# Patient Record
Sex: MALE | Race: BLACK OR AFRICAN AMERICAN | NOT HISPANIC OR LATINO | Employment: UNEMPLOYED | URBAN - METROPOLITAN AREA
[De-identification: names, ages, dates, MRNs, and addresses within clinical notes are randomized per-mention and may not be internally consistent; named-entity substitution may affect disease eponyms.]

---

## 2017-02-05 ENCOUNTER — HOSPITAL ENCOUNTER (EMERGENCY)
Facility: MEDICAL CENTER | Age: 52
End: 2017-02-16
Attending: EMERGENCY MEDICINE
Payer: MEDICAID

## 2017-02-05 DIAGNOSIS — F15.10 METHAMPHETAMINE ABUSE (HCC): ICD-10-CM

## 2017-02-05 LAB
ALBUMIN SERPL BCP-MCNC: 4.5 G/DL (ref 3.2–4.9)
ALBUMIN/GLOB SERPL: 1.7 G/DL
ALP SERPL-CCNC: 88 U/L (ref 30–99)
ALT SERPL-CCNC: 26 U/L (ref 2–50)
AMPHET UR QL SCN: POSITIVE
ANION GAP SERPL CALC-SCNC: 9 MMOL/L (ref 0–11.9)
AST SERPL-CCNC: 36 U/L (ref 12–45)
BARBITURATES UR QL SCN: NEGATIVE
BASOPHILS # BLD AUTO: 0.7 % (ref 0–1.8)
BASOPHILS # BLD: 0.07 K/UL (ref 0–0.12)
BENZODIAZ UR QL SCN: NEGATIVE
BILIRUB SERPL-MCNC: 1 MG/DL (ref 0.1–1.5)
BUN SERPL-MCNC: 12 MG/DL (ref 8–22)
BZE UR QL SCN: NEGATIVE
CALCIUM SERPL-MCNC: 9.8 MG/DL (ref 8.5–10.5)
CANNABINOIDS UR QL SCN: NEGATIVE
CHLORIDE SERPL-SCNC: 100 MMOL/L (ref 96–112)
CO2 SERPL-SCNC: 25 MMOL/L (ref 20–33)
CREAT SERPL-MCNC: 0.87 MG/DL (ref 0.5–1.4)
EKG IMPRESSION: NORMAL
EOSINOPHIL # BLD AUTO: 0.34 K/UL (ref 0–0.51)
EOSINOPHIL NFR BLD: 3.3 % (ref 0–6.9)
ERYTHROCYTE [DISTWIDTH] IN BLOOD BY AUTOMATED COUNT: 42.3 FL (ref 35.9–50)
ETHANOL BLD-MCNC: 0 G/DL
GFR SERPL CREATININE-BSD FRML MDRD: >60 ML/MIN/1.73 M 2
GLOBULIN SER CALC-MCNC: 2.7 G/DL (ref 1.9–3.5)
GLUCOSE SERPL-MCNC: 145 MG/DL (ref 65–99)
HCT VFR BLD AUTO: 42.8 % (ref 42–52)
HGB BLD-MCNC: 14.4 G/DL (ref 14–18)
IMM GRANULOCYTES # BLD AUTO: 0.02 K/UL (ref 0–0.11)
IMM GRANULOCYTES NFR BLD AUTO: 0.2 % (ref 0–0.9)
LYMPHOCYTES # BLD AUTO: 2.12 K/UL (ref 1–4.8)
LYMPHOCYTES NFR BLD: 20.6 % (ref 22–41)
MCH RBC QN AUTO: 30.6 PG (ref 27–33)
MCHC RBC AUTO-ENTMCNC: 33.6 G/DL (ref 33.7–35.3)
MCV RBC AUTO: 90.9 FL (ref 81.4–97.8)
MDMA UR QL SCN: NEGATIVE
METHADONE UR QL SCN: NEGATIVE
MONOCYTES # BLD AUTO: 1.02 K/UL (ref 0–0.85)
MONOCYTES NFR BLD AUTO: 9.9 % (ref 0–13.4)
NEUTROPHILS # BLD AUTO: 6.7 K/UL (ref 1.82–7.42)
NEUTROPHILS NFR BLD: 65.3 % (ref 44–72)
NRBC # BLD AUTO: 0 K/UL
NRBC BLD AUTO-RTO: 0 /100 WBC
OPIATES UR QL SCN: NEGATIVE
OXYCODONE UR QL SCN: NEGATIVE
PCP UR QL SCN: NEGATIVE
PLATELET # BLD AUTO: 304 K/UL (ref 164–446)
PMV BLD AUTO: 9.6 FL (ref 9–12.9)
POTASSIUM SERPL-SCNC: 3.9 MMOL/L (ref 3.6–5.5)
PROPOXYPH UR QL SCN: NEGATIVE
PROT SERPL-MCNC: 7.2 G/DL (ref 6–8.2)
RBC # BLD AUTO: 4.71 M/UL (ref 4.7–6.1)
SODIUM SERPL-SCNC: 134 MMOL/L (ref 135–145)
WBC # BLD AUTO: 10.3 K/UL (ref 4.8–10.8)

## 2017-02-05 PROCEDURE — 80307 DRUG TEST PRSMV CHEM ANLYZR: CPT

## 2017-02-05 PROCEDURE — 700102 HCHG RX REV CODE 250 W/ 637 OVERRIDE(OP): Performed by: EMERGENCY MEDICINE

## 2017-02-05 PROCEDURE — A9270 NON-COVERED ITEM OR SERVICE: HCPCS | Performed by: EMERGENCY MEDICINE

## 2017-02-05 PROCEDURE — 36415 COLL VENOUS BLD VENIPUNCTURE: CPT

## 2017-02-05 PROCEDURE — 80053 COMPREHEN METABOLIC PANEL: CPT

## 2017-02-05 PROCEDURE — 93005 ELECTROCARDIOGRAM TRACING: CPT | Performed by: EMERGENCY MEDICINE

## 2017-02-05 PROCEDURE — 99285 EMERGENCY DEPT VISIT HI MDM: CPT

## 2017-02-05 PROCEDURE — 700105 HCHG RX REV CODE 258: Performed by: EMERGENCY MEDICINE

## 2017-02-05 PROCEDURE — 85025 COMPLETE CBC W/AUTO DIFF WBC: CPT

## 2017-02-05 RX ORDER — LORAZEPAM 1 MG/1
1 TABLET ORAL ONCE
Status: COMPLETED | OUTPATIENT
Start: 2017-02-05 | End: 2017-02-05

## 2017-02-05 RX ORDER — SODIUM CHLORIDE 9 MG/ML
1000 INJECTION, SOLUTION INTRAVENOUS ONCE
Status: COMPLETED | OUTPATIENT
Start: 2017-02-05 | End: 2017-02-05

## 2017-02-05 RX ORDER — ZIPRASIDONE HYDROCHLORIDE 20 MG/1
20 CAPSULE ORAL ONCE
Status: COMPLETED | OUTPATIENT
Start: 2017-02-05 | End: 2017-02-05

## 2017-02-05 RX ADMIN — LORAZEPAM 1 MG: 1 TABLET ORAL at 13:50

## 2017-02-05 RX ADMIN — ZIPRASIDONE HCL 20 MG: 20 CAPSULE ORAL at 14:16

## 2017-02-05 RX ADMIN — SODIUM CHLORIDE 1000 ML: 9 INJECTION, SOLUTION INTRAVENOUS at 13:51

## 2017-02-05 NOTE — ED AVS SNAPSHOT
Home Care Instructions                                                                                                                Ulises Matthews   MRN: [id number]    Department:  [facility], Emergency Dept   Date of Visit:  2/5/2017            [facility], Emergency Dept    [address] [facility] [address]    Phone:  [phone]      You were seen by     1. Jason Cochran M.D.    2. Johny John M.D.    3. Stuart Zafar M.D.    4. Chuck Montero M.D.    5. Walod Hamilton M.D.    6. Zion Mcgill M.D.    7. Doc Valencia M.D.    8. Noe Tubbs M.D.    9. Aric Marie M.D.    10. JACOBY Belcher.O.    11. Jayson Dillon M.D.    12. Yaya Manley M.D.    13. Mayur Chun M.D.    14. Epi Palafox M.D.    15. Gadiel Perrin M.D.    16. Wicho Alvarado M.D.    17. JACOBY Jarquin.O.    18. JACOBY Moya.O.    19. Yvette Gudino M.D.    20. JACOBY Hennessy.O.    21. Sulaiman Osorio M.D.      Your Diagnosis Was     Methamphetamine abuse     F15.10       These are the medications you received during your hospitalization from 02/05/2017 1255 to 02/16/2017 1745     Date/Time Order Dose Route Action    02/05/2017 1351 NS infusion 1,000 mL 1,000 mL Intravenous New Bag    02/05/2017 1416 ziprasidone (GEODON) capsule 20 mg 20 mg Oral Given    02/05/2017 1350 lorazepam (ATIVAN) tablet 1 mg 1 mg Oral Given    02/06/2017 0045 haloperidol lactate (HALDOL) injection 5 mg 5 mg Intramuscular Given    02/06/2017 0045 lorazepam (ATIVAN) injection 2 mg 2 mg Intramuscular Given    02/08/2017 0831 risperidone (RISPERDAL) tablet 1 mg 1 mg Oral Given    02/07/2017 2052 risperidone (RISPERDAL) tablet 1 mg 1 mg Oral Given    02/07/2017 0840 risperidone (RISPERDAL) tablet 1 mg 1 mg Oral Given    02/06/2017 2050 risperidone (RISPERDAL) tablet 1 mg 1 mg Oral Given    02/06/2017 1015 risperidone (RISPERDAL) tablet 1 mg 1 mg Oral Given    02/16/2017 0854 risperidone (RISPERDAL) tablet 2 mg 2 mg Oral Given    02/15/2017 2108 risperidone (RISPERDAL) tablet 2 mg 2 mg Oral Given    02/15/2017 0920 risperidone (RISPERDAL) tablet 2 mg 2 mg Oral Given    02/14/2017 2053 risperidone (RISPERDAL) tablet 2 mg 2 mg Oral Given    02/14/2017 0921 risperidone (RISPERDAL) tablet 2 mg 2 mg Oral Given    02/13/2017 2206 risperidone (RISPERDAL) tablet 2 mg 2 mg Oral Given    02/13/2017 0900 risperidone (RISPERDAL) tablet 2 mg 2 mg Oral Given    02/12/2017 2151 risperidone (RISPERDAL) tablet 2 mg 2 mg Oral Given    02/12/2017 0800 risperidone (RISPERDAL) tablet 2 mg 2 mg Oral Given    02/11/2017 2119 risperidone (RISPERDAL) tablet 2 mg 2 mg Oral Given    02/11/2017 0801 risperidone (RISPERDAL) tablet 2 mg 2 mg Oral Given    02/10/2017 2051 risperidone (RISPERDAL) tablet 2 mg 2 mg Oral Given    02/10/2017 1137 risperidone (RISPERDAL) tablet 2 mg 2 mg Oral Given    02/10/2017 0849 risperidone (RISPERDAL) tablet 2 mg 2 mg Oral Refused    02/09/2017 2108 risperidone (RISPERDAL) tablet 2 mg 2 mg Oral Given    02/09/2017 0853 risperidone (RISPERDAL) tablet 2 mg 2 mg Oral Given    02/08/2017 1956 risperidone (RISPERDAL) tablet 2 mg 2 mg Oral Given    02/10/2017 1135 acetaminophen (TYLENOL) tablet 650 mg 650 mg Oral Given    02/12/2017 1427 ibuprofen (MOTRIN) tablet 600 mg 600 mg Oral Given      Medication Information     Review all of your home medications and newly ordered medications with your primary doctor and/or pharmacist as soon as possible. Follow medication instructions as directed by your doctor and/or pharmacist.     Please keep your complete medication list with you and share with your physician. Update the information when medications are discontinued, doses are changed, or new medications (including over-the-counter products) are added; and carry medication information at all times in the event of emergency situations.               Medication List      Notice        You have not been prescribed any medications.            Procedures and tests performed during your visit     Procedure/Test Number of Times Performed    Blood Alcohol 1    CBC WITH DIFFERENTIAL 1    COMP METABOLIC PANEL 1    Cardiac Monitoring 1    EKG (NOW) 1    ESTIMATED GFR 1    IP CONSULT TO  2    IV Saline Lock 1    Life-Skills consult 1    NOTIFY ADMITTING  OF SUICIDE RISK 1    NURSING COMMUNICATION 2    No Visitors 2    Obtain Old EKG 1    Offer patient opportunity to perform ADLs daily 2    Offer same diet options as would be offered to the inpatient population 2    PHYSICAN COMMUNICATION 1    RN to Round on Patient every 2 hours 2    Transfer patient to [facility] under direct observation 2    URINE DRUG SCREEN (TRIAGE) 1    Vital Signs every 4 hours 2        Discharge Instructions       Refrain from further methamphetamine abuse          Patient Information     Patient Information    Following emergency treatment: all patient requiring follow-up care must return either to a private physician or a clinic if your condition worsens before you are able to obtain further medical attention, please return to the emergency room.     Billing Information    At [facility], we work to make the billing process streamlined for our patients.  Our Representatives are here to answer any questions you may have regarding your hospital bill.  If you have insurance coverage and have supplied your insurance information to us, we will submit a claim to your insurer on your behalf.  Should you have any questions regarding your bill, we can be reached online or by phone as follows:  Online: You are able pay your bills online or live chat with our representatives about any billing questions you may have. We are here to help Monday - Friday from 8:00am to 7:30pm and 9:00am - 12:00pm on Saturdays.  Please visit [email or web address]  for more information.   Phone:  [phone]  or [phone]    Please note that your emergency physician, surgeon, pathologist, radiologist, anesthesiologist, and other specialists are not employed by [facility] and will therefore bill separately for their services.  Please contact them directly for any questions concerning their bills at the numbers below:     Emergency Physician Services:  [phone]  [facility]:  [phone]  [facility]:  [phone]  [facility]:  [phone]    1. Your final bill may vary from the amount quoted upon discharge if all procedures are not complete at that time, or if your doctor has additional procedures of which we are not aware. You will receive an additional bill if you return to the Emergency Department at [facility] for suture removal regardless of the facility of which the sutures were placed.     2. Please arrange for settlement of this account at the emergency registration.    3. All self-pay accounts are due in full at the time of treatment.  If you are unable to meet this obligation then payment is expected within 4-5 days.     4. If you have had radiology studies (CT, X-ray, Ultrasound, MRI), you have received a preliminary result during your emergency department visit. Please contact the radiology department [phone] to receive a copy of your final result. Please discuss the Final result with your primary physician or with the follow up physician provided.     Crisis Hotline:  [facility] [place] Crisis Hotline:  9-410-HNCZGOZ or 1-312.706.3800  [place] Crisis Hotline:    1-642.370.1995 or 706-720-8163         ED Discharge Follow Up Questions    1. In order to provide you with very good care, we would like to follow up with a phone call in the next few days.  May we have your permission to contact you?     YES /  NO    2. What is the best phone number to call you? (       )_____-__________    3. What is the best time to call you?      Morning  /  Afternoon  /   Evening                   Patient Signature:  ____________________________________________________________    Date:  ____________________________________________________________

## 2017-02-05 NOTE — ED AVS SNAPSHOT
2/16/2017          Ulises NIETO [facility]  [address] [place] [address]    Dear Ulises:    [facility] wants to ensure your discharge home is safe and you or your loved ones have had all your questions answered regarding your care after you leave the hospital.    You may receive a telephone call within two days of your discharge.  This call is to make certain you understand your discharge instructions as well as ensure we provided you with the best care possible during your stay with us.     The call will only last approximately 3-5 minutes and will be done by a nurse.    Once again, we want to ensure your discharge home is safe and that you have a clear understanding of any next steps in your care.  If you have any questions or concerns, please do not hesitate to contact us, we are here for you.  Thank you for choosing [facility] for your healthcare needs.    Sincerely,    Jayson Armendariz    [facility]

## 2017-02-05 NOTE — ED AVS SNAPSHOT
SenseHere Technology Access Code: [email or web address]  Expires: 3/7/2017 11:55 PM    Your email address is not on file at [facility].  Email Addresses are required for you to sign up for [employer], please contact [phone] to verify your personal information and to provide your email address prior to attempting to register for [employer].    Ulises NIETO [facility]  [address] [place] [address]    [employer]  A secure, online tool to manage your health information     [facility]’s SenseHere Technology® is a secure, online tool that connects you to your personalized health information from the privacy of your home -- day or night - making it very easy for you to manage your healthcare. Once the activation process is completed, you can even access your medical information using the SenseHere Technology amparo, which is available for free in the Apple Amparo store or Google Play store.     To learn more about SenseHere Technology, visit [email or web address]    There are two levels of access available (as shown below):   My Chart Features  [facility] Primary Care Doctor [facility]  Specialists [facility]  Urgent  Care Non-[facility] Primary Care Doctor   Email your healthcare team securely and privately 24/7 X X X    Manage appointments: schedule your next appointment; view details of past/upcoming appointments X      Request prescription refills. X      View recent personal medical records, including lab and immunizations X X X X   View health record, including health history, allergies, medications X X X X   Read reports about your outpatient visits, procedures, consult and ER notes X X X X   See your discharge summary, which is a recap of your hospital and/or ER visit that includes your diagnosis, lab results, and care plan X X  X     How to register for [employer]:  Once your e-mail address has been verified, follow the following steps to sign up for [employer].     1. Go to  [email or web address]  2. Click on the Sign Up Now box, which takes you to the New Member Sign Up page. You will need  to provide the following information:  a. Enter your Bo[employer] Access Code exactly as it appears at the top of this page. (You will not need to use this code after you’ve completed the sign-up process. If you do not sign up before the expiration date, you must request a new code.)   b. Enter your date of birth.   c. Enter your home email address.   d. Click Submit, and follow the next screen’s instructions.  3. Create a Boqii ID. This will be your Boqii login ID and cannot be changed, so think of one that is secure and easy to remember.  4. Create a Boqii password. You can change your password at any time.  5. Enter your Password Reset Question and Answer. This can be used at a later time if you forget your password.   6. Enter your e-mail address. This allows you to receive e-mail notifications when new information is available in Bo[employer].  7. Click Sign Up. You can now view your health information.    For assistance activating your [employer] account, call [phone]

## 2017-02-05 NOTE — ED NOTES
Chief Complaint   Patient presents with   • Hallucinations     Pt BIB EMS from [facility]. pt reports taking meth yesterday. pt denies thought of SI/HI, pt rambling in triage making paranoid statements.    • Anxiety

## 2017-02-06 PROCEDURE — 700111 HCHG RX REV CODE 636 W/ 250 OVERRIDE (IP)

## 2017-02-06 PROCEDURE — A9270 NON-COVERED ITEM OR SERVICE: HCPCS | Performed by: PSYCHIATRY & NEUROLOGY

## 2017-02-06 PROCEDURE — 700102 HCHG RX REV CODE 250 W/ 637 OVERRIDE(OP): Performed by: PSYCHIATRY & NEUROLOGY

## 2017-02-06 PROCEDURE — 90791 PSYCH DIAGNOSTIC EVALUATION: CPT

## 2017-02-06 RX ORDER — HALOPERIDOL 5 MG/ML
5 INJECTION INTRAMUSCULAR ONCE
Status: COMPLETED | OUTPATIENT
Start: 2017-02-06 | End: 2017-02-06

## 2017-02-06 RX ORDER — LORAZEPAM 2 MG/ML
INJECTION INTRAMUSCULAR
Status: COMPLETED
Start: 2017-02-06 | End: 2017-02-06

## 2017-02-06 RX ORDER — RISPERIDONE 1 MG/1
1 TABLET ORAL 2 TIMES DAILY
Status: DISCONTINUED | OUTPATIENT
Start: 2017-02-06 | End: 2017-02-08

## 2017-02-06 RX ORDER — LORAZEPAM 2 MG/ML
2 INJECTION INTRAMUSCULAR ONCE
Status: COMPLETED | OUTPATIENT
Start: 2017-02-06 | End: 2017-02-06

## 2017-02-06 RX ORDER — HALOPERIDOL 5 MG/ML
INJECTION INTRAMUSCULAR
Status: COMPLETED
Start: 2017-02-06 | End: 2017-02-06

## 2017-02-06 RX ADMIN — RISPERIDONE 1 MG: 1 TABLET, FILM COATED ORAL at 10:15

## 2017-02-06 RX ADMIN — LORAZEPAM 2 MG: 2 INJECTION INTRAMUSCULAR at 00:45

## 2017-02-06 RX ADMIN — HALOPERIDOL 5 MG: 5 INJECTION INTRAMUSCULAR at 00:45

## 2017-02-06 RX ADMIN — HALOPERIDOL LACTATE 5 MG: 5 INJECTION, SOLUTION INTRAMUSCULAR at 00:45

## 2017-02-06 RX ADMIN — LORAZEPAM 2 MG: 2 INJECTION INTRAMUSCULAR; INTRAVENOUS at 00:45

## 2017-02-06 RX ADMIN — RISPERIDONE 1 MG: 1 TABLET, FILM COATED ORAL at 20:50

## 2017-02-06 ASSESSMENT — PAIN SCALES - GENERAL: PAINLEVEL_OUTOF10: 0

## 2017-02-06 NOTE — ED PROVIDER NOTES
"ED Provider Note    CHIEF COMPLAINT  Chief Complaint   Patient presents with   • Hallucinations     Pt BIB EMS from [facility]. pt reports taking meth yesterday. pt denies thought of SI/HI, pt rambling in triage making paranoid statements.    • Anxiety       HPI  Ulises Matthesw is a 51 y.o. male who presents to the emergency department brought in by ambulance from a local hotel after he was observed making paranoid statements and exhibiting abnormal behavior. Reportedly the patient has been using methamphetamine. The patient is very talkative at the time of arrival but he is quite rambling and really does not make any sense and clinically appears to be intoxicated. The patient cannot provide a reliable review of systems or history of present illness at this time    REVIEW OF SYSTEMS the patient cannot provide a reliable review of systems at this time    PAST MEDICAL HISTORY  Past Medical History   Diagnosis Date   • Psychiatric Disorder      depression       FAMILY HISTORY  No family history on file.    SOCIAL HISTORY  Social History     Social History   • Marital Status: Single     Spouse Name: N/A   • Number of Children: N/A   • Years of Education: N/A     Social History Main Topics   • Smoking status: Current Every Day Smoker   • Smokeless tobacco: Not on file   • Alcohol Use: Yes   • Drug Use: Yes      Comment: cocaine   • Sexual Activity: Not on file     Other Topics Concern   • Not on file     Social History Narrative   • No narrative on file       SURGICAL HISTORY  Past Surgical History   Procedure Laterality Date   • Other abdominal surgery       hernia repair       CURRENT MEDICATIONS  Home Medications     **Home medications have not yet been reviewed for this encounter**          ALLERGIES  No Known Allergies    PHYSICAL EXAM  VITAL SIGNS: /82 mmHg  Pulse 90  Temp(Src) 36.7 °C (98 °F) (Temporal)  Resp 18  Ht 1.829 m (6' 0.01\")  Wt 79.5 kg (175 lb 4.3 oz)  BMI 23.77 kg/m2  SpO2 99% "   Oxygen saturation is interpreted as adequate  Constitutional: The patient is awake he is verbal he's rambling  HENT: No sign of acute trauma to the head  Eyes: Pupils round extraocular motion present  Neck: Trachea midline no JVD  Cardiovascular: Regular rate and rhythm  Lungs: Clear and equal bilaterally with no apparent difficulty breathing  Abdomen/Back: Soft nondistended nontender no peritoneal findings  Skin: Warm and dry  Musculoskeletal: No acute bony deformity  Neurologic: Awake and verbal and moving all extremities    Laboratory  A CBC shows a normal white blood count of 10.3, hemoglobin is adequate at 14.4, basic metabolic panel shows a minimally elevated blood glucose of 145, LFTs are unremarkable, blood alcohol level is 0, urine toxicology screen is positive for amphetamine    EKG interpretation  A 12-lead EKG shows a sinus rhythm 85 bpm large voltages were noted in V1 and V2 and V3 with T-wave inversion in V1 and V2 and J-point elevation in V3 and findings suggest left ventricular hypertrophy.    MEDICAL DECISION MAKING and DISPOSITION  In the emergency department an IV was established the patient was given intravenous fluids as well as oral Geodon and Ativan. He is resting comfortably and sleeping he is arousable. His mental status does seem to be improving although he can still not carry on a normal conversation. At this point in time we are going to give the patient some more time to metabolize the methamphetamine and once he is able to carry on a regular conversation he will require reevaluation. The patient's care will be signed out to the overnight ER doctor    IMPRESSION  1. Methamphetamine abuse         Electronically signed by: Jason Cochran, 2/5/2017 6:18 PM

## 2017-02-06 NOTE — ED NOTES
"Med rec updated and complete  Allergies reviewed  Pt states \"No prescription medications for over a year\".  Pt states \"No vitamins or OTC's\".  Pt states \"No antibiotics in the last 30 days\".    "

## 2017-02-06 NOTE — ED NOTES
"Upon discharge . Pt started to say \"im mental, im going to kill myself. im going to throw myself off of the [place] . Every one is trying to kill me. im going to die,they are going to kill me. im mental.\" pt then begun to become aggressive yelling, screaming stating  \" you mother fuckers dont do shit. All you care about is i did meth . im fucking retarded assholes.\" security at bedside. MD notified. life skills notified.  "

## 2017-02-06 NOTE — ED NOTES
Pt given meal and crackers in addition to medication. Pt much more calm from recent incident, compliant and resting in bed.

## 2017-02-06 NOTE — DISCHARGE PLANNING
Medical Social Work    Referral: Legal Hold    Intervention: Legal Hold Paperwork given to SW by Life Skills RN: Angie    Legal Hold Initiated: Date: 02/06/2017 Time: 0030    Patient’s Insurance Listed on Face Sheet: Medicaid HMO    [facility]    Plan: Patient will transfer to mental health facility once acceptance is obtained.

## 2017-02-06 NOTE — PROGRESS NOTES
Patient's home medications have been reviewed by the pharmacy team.     Past Medical History   Diagnosis Date   • Psychiatric Disorder      depression       Patient's Medications   New Prescriptions    No medications on file   Previous Medications    No medications on file   Modified Medications    No medications on file   Discontinued Medications    QUETIAPINE FUMARATE (SEROQUEL PO)    Take  by mouth every day.          A:  Patient denies taking medications for over one year.    P:    No specific recommendations at this time. Manage symptoms PRN.  Defer to Psychiatry's recommendations    Libra Barroso, PharmD, BCPS

## 2017-02-06 NOTE — ED PROVIDER NOTES
ED Provider Note    The patient continues to await transfer for inpatient psychiatric evaluation and treatment. His vital signs remained normal. He has been fed and ambulatory. He is currently resting with no acute needs.

## 2017-02-06 NOTE — CONSULTS
[facility]   TRIAGE ASSESSMENT    Name: Ulises Matthews  MRN: [id number]  : 1965  Age: 51 y.o.  Date of assessment: 2017  PCP: Pcp Pt States None  Persons in attendance: Patient    CHIEF COMPLAINT/PRESENTING ISSUE as stated by Dr John, patient has been in the ER for over 12 hours without making any SI/HI statements. As soon as he was being discharged.  He threatened to kill himself and anyone else. Positive for meth on arrival.  He refused this assessment.    Chief Complaint   Patient presents with   • Hallucinations     Pt BIB EMS from [facility]. pt reports taking meth yesterday. pt denies thought of SI/HI, pt rambling in triage making paranoid statements.    • Anxiety        CURRENT LIVING SITUATION/SOCIAL SUPPORT:Homeless    BEHAVIORAL HEALTH TREATMENT HISTORY  Does patient/parent report a history of prior behavioral health treatment for patient?   Yes:    Dates Level of Care Facilty/Provider Diagnosis/Problem Medications    from the ER [facility] per our records                                                                          SAFETY ASSESSMENT - SELF  Does patient acknowledge current or past symptoms of dangerousness to self? Yes, made states when being discharged.  Does parent/significant other report patient has current or past symptoms of dangerousness to self? N\A  Does presenting problem suggest symptoms of dangerousness to self? Yes:  Put on legal hold by Dr John   Past Current    Suicidal Thoughts: []  []    Suicidal Plans: []  []    Suicidal Intent: []  []    Suicide Attempts: []  []    Self-Injury []  []      For any boxes checked above, provide detail: made statements in the past and today.  Still alive.    History of suicide by family member: no  History of suicide by friend/significant other: no  Recent change in frequency/specificity/intensity of suicidal thoughts or self-harm behavior? no  Current access to firearms, medications, or other  "identified means of suicide/self-harm? no  If yes, willing to restrict access to means of suicide/self-harm? no  Protective factors present:  on legal hold    SAFETY ASSESSMENT - OTHERS  Does patient acknowledge current or past symptoms of aggressive behavior or risk to others? Yes, made threats today.  Does parent/significant other report patient has current or past symptoms of aggressive behavior or risk to others?  N\A  Does presenting problem suggest symptoms of dangerousness to others? No    Crisis Safety Plan completed and copy given to patient? N\A    ABUSE/NEGLECT SCREENING  Does patient report feeling “unsafe” in his/her home, or afraid of anyone?  no  Does patient report any history of physical, sexual, or emotional abuse?  no  Does parent or significant other report any of the above? N\A  Is there evidence of neglect by self?  no  Is there evidence of neglect by a caregiver? no  Does the patient/parent report any history of CPS/APS/police involvement related to suspected abuse/neglect or domestic violence? no  Based on the information provided during the current assessment, is a mandated report of suspected abuse/neglect being made?  No    SUBSTANCE USE SCREENING  Yes:  Jacky all substances used in the past 30 days:chronic long term meth use disorder      Last Use Amount   []   Alcohol     []   Marijuana     []   Heroin     []   Prescription Opioids  (used without prescription, for    recreation, or in excess of prescribed amount)     []   Other Prescription  (used without prescription, for    recreation, or in excess of prescribed amount)     []   Cocaine      [x]   Methamphetamine yesterday    []   \"\" drugs (ectasy, MDMA)     []   Other substances        UDS results: positive for methamphetamines    Breathalyzer results: 0.0    What consequences does the patient associate with any of the above substance use and or addictive behaviors? Other: impacts every area of his life.    Risk factors for " detox (check all that apply):None   []  Seizures   []  Diaphoretic (sweating)   []  Tremors   []  Hallucinations   []  Increased blood pressure   []  Decreased blood pressure   []  Other   [x]  None      [] Patient education on risk factors for detoxification and instructed to return to ER as needed.    MENTAL STATUS   Participation: would not participate  Grooming: Disheveled  Orientation: would not participate  Behavior: asleep  Eye contact: none  Mood: Angry and Irritable  earlier  Affect: none  Thought process: Goal-directed  Thought content: Paranoia  On meth  Speech: Loud  Earlier  Perception: would not participate  Memory:  No gross evidence of memory deficits  Insight: would not participate  Judgment:  would not particpate  Other:    Collateral information:   Source:  [] Significant other present in person:   [] Significant other by telephone  [] [facility]   [] [facility] Nursing Staff  [x] [facility], no assessments found.  [] Other:     [] Unable to complete full assessment due to:  [] Acute intoxication  [x] Patient declined to participate/engage  [] Patient verbally unresponsive  [] Significant cognitive deficits  [] Significant perceptual distortions or behavioral disorganization  [] Other:      CLINICAL IMPRESSIONS:  Primary:  Reports SI/HI so he did not have to leave the ER, malinger suspected  Secondary:  Homeless      IDENTIFIED NEEDS/PLAN:  [Trigger DISPOSITION list for any items marked]  Made statements tonight  [x]  Imminent safety risk - self [x] Imminent safety risk - others   []  Acute substance withdrawl []  Psychosis/Impaired reality testing   [x]  Mood/anxiety []  Substance use/Addictive behavior   []  Maladaptive behaviro []  Parent/child conflict   []  Family/Couples conflict []  Biomedical   [x]  Housing [x]  Financial   []   Legal  Occupational/Educational   []  Domestic violence []  Other:     Disposition: Refer to [facility] and [facility]    Does patient express  agreement with the above plan? no    Referral appointment(s) scheduled? N\A    Alert team only:   I have discussed findings and recommendations with Dr. John who is in agreement with these recommendations.   Legal hold completed.  Referral documentation sent to the following facilities:  [facility] and [facility] for evaluation and treatment.     Angie Hills R.N.  2/6/2017

## 2017-02-06 NOTE — ED PROVIDER NOTES
ED Provider Note    The patient is alert and appropriate at this time. Therefore he'll be discharged with instructions to refrain from any further substance abuse.    At the time of discharge the patient became acutely suicidal and agitated. He states is given a jump off the [place]  to kill himself. Therefore the patient replaced on a legal hold and life skills will evaluate the patient. He is medically cleared for psychiatric care at this time.

## 2017-02-06 NOTE — ED NOTES
Patient denies pain and is resting comfortably. Updated on breakfast tray ordered and to be given when box lunch delivered

## 2017-02-06 NOTE — ED NOTES
Patient denies pain and is resting comfortably.    no renal colic/no flank pain L/normal urinary frequency/no flank pain R/no bladder infections/no urinary hesitancy/no urine discoloration/no incontinence/no nocturia/no dysuria/no hematuria

## 2017-02-07 PROCEDURE — 700102 HCHG RX REV CODE 250 W/ 637 OVERRIDE(OP): Performed by: PSYCHIATRY & NEUROLOGY

## 2017-02-07 PROCEDURE — A9270 NON-COVERED ITEM OR SERVICE: HCPCS | Performed by: PSYCHIATRY & NEUROLOGY

## 2017-02-07 RX ADMIN — RISPERIDONE 1 MG: 1 TABLET, FILM COATED ORAL at 20:52

## 2017-02-07 RX ADMIN — RISPERIDONE 1 MG: 1 TABLET, FILM COATED ORAL at 08:40

## 2017-02-07 NOTE — ED NOTES
Patient is resting comfortably. Rise and fall of chest noted.  Med given per mar.  No other request at this time

## 2017-02-07 NOTE — PSYCHIATRY
"PSYCHIATRIC CONSULTATION:  Reason for consult: psychosis   Requesting Physician: Sammie  Psychiatric Supervising Attending: Juan F        Chief Complaint:   \"I'm not okay right now\"    HPI:  Was agitated and mildly hostile during the interview. He was taking meth and was thinking of jumping off the [place] of the [place]. He was behaving strangely and states he can't really remember what happened. He states he started hearing voices and then took some meth which made it worse. He was disorganized. He gets distracted very easily. He reports that still wants to die. He is a very poor historian and can't give me much information about his psychiatric care outside of here. He can't remember medications he has tried in the past. He thinks risperdal has worked for him.     Review of Current Systems:  Depression +depressed mood  +anhedonia  Low energy   Sonam no signs/s symptoms   Anxiety +  Psychosis +  PTSD unable to assess  OCD  Unable to asses   Medical: no active issues     MSE:  Vitals:   Filed Vitals:    02/06/17 2009 02/07/17 0044 02/07/17 0400 02/07/17 0907   BP: 106/69 103/70 106/79 108/63   Pulse: 82 79 80 80   Temp: 36.7 °C (98 °F) 37.1 °C (98.8 °F) 36.9 °C (98.4 °F) 36.8 °C (98.3 °F)   TempSrc:       Resp: 17 14 16 16   Height:       Weight:       SpO2:  98% 97% 96%       Musculoskeletal: no complaints   Appearance: +psychomotor retardation   Thought Process: +thought blocking   Though Content: +ah +delusions, paranoia   Speech: slow   Mood:depressed  Affect: blunted   SI/HI:+si   Attention: poor   Memory:fair   Orientation: oriented   Insight and Judgment:  Not oriented to date   Neurological Testing: n/a    Past Psych Hx:  Previous outpatient psych treatment     Family Psych Hx:  Unable to full assess    Social Hx:  Social History     Social History   • Marital Status: Single     Spouse Name: N/A   • Number of Children: N/A   • Years of Education: N/A     Occupational History   • Not on file.     Social " History Main Topics   • Smoking status: Current Every Day Smoker   • Smokeless tobacco: Not on file   • Alcohol Use: Yes   • Drug Use: Yes      Comment: cocaine   • Sexual Activity: Not on file     Other Topics Concern   • Not on file     Social History Narrative   • No narrative on file       Drugs/Alcohol/Tobacco Hx:   +meth     Medical Hx:    Past Medical History   Diagnosis Date   • Psychiatric Disorder      depression              Allergies:  No Known Allergies    Medications:   No current facility-administered medications on file prior to encounter.     No current outpatient prescriptions on file prior to encounter.       Labs/ Imaging:  No orders to display         SI/HI Assessment Risk:   High      Assessment:  MDD   Amphetamine Use disorder    Substance induced psychosis      Plan:  Starting risperdal 1mg po bid         Psych will follow.   Thank you for the consult.

## 2017-02-07 NOTE — PSYCHIATRY
PSYCHIATRIC FOLLOW-UP NOTE  Supervising Attending: Juan F    ID:   50 y/o man with amphetamine use disorder, psychosis     S:  Reports he is feeling slightly better today, voices decreasing, feels suicidal however. Quite depressed. Getting very distracted and has a hard time finishing conversation with me.     ROS:  +depression   +pschosis   No signs/symptoms nadya   All other systems reviewed and are negative.     MSE:  Vitals:  Filed Vitals:    02/06/17 2009 02/07/17 0044 02/07/17 0400 02/07/17 0907   BP: 106/69 103/70 106/79 108/63   Pulse: 82 79 80 80   Temp: 36.7 °C (98 °F) 37.1 °C (98.8 °F) 36.9 °C (98.4 °F) 36.8 °C (98.3 °F)   TempSrc:       Resp: 17 14 16 16   Height:       Weight:       SpO2:  98% 97% 96%       Appearance: grooming wnl   Behavior+psychomotor retardation   Speech:slow, blunted tone   Mood:depressed  Affect: constricted   Thought Process:thought blocking   Thought Content  delusions  Cognition: impaired  Memory:impaired  Attention:poor   Judgment:poor   Insight poor     A:  Pyschosis nos, schizoaffective vs mdd with psychosis   Amphetamine use disorder     P:  Continue risperdal

## 2017-02-07 NOTE — ED PROVIDER NOTES
ED PROVIDER NOTE    Scribed for Zion Mcgill M.D. by Meena Isabel. 2/7/2017, 6:00 AM.    This is an addendum to the note on Ulises Matthews. For further details and full chart entry, see the previously signed ED Provider Note written by Dr. Zafar (ERP).      6:00 AM- I discussed the patient's case with Dr. Zafar (ERP) who will transfer care of the patient to me at this time.        1:30 PM - The patient has done well during my period of observation. They are resting comfortably. They continue to await transfer to an inpatient psychiatric facility.      I, Meena Isabel (Scribe), am scribing for, and in the presence of, Zion Mcgill M.D..    Electronically signed by: Meena Isabel (Jacintoibe), 2/7/2017    IZion M.D. personally performed the services described in this documentation, as scribed by Meena Isabel in my presence, and it is both accurate and complete.    The note accurately reflects work and decisions made by me.  Zion Mcgill  2/7/2017  2:01 PM

## 2017-02-08 PROCEDURE — 700102 HCHG RX REV CODE 250 W/ 637 OVERRIDE(OP): Performed by: PSYCHIATRY & NEUROLOGY

## 2017-02-08 PROCEDURE — A9270 NON-COVERED ITEM OR SERVICE: HCPCS | Performed by: PSYCHIATRY & NEUROLOGY

## 2017-02-08 RX ORDER — RISPERIDONE 2 MG/1
2 TABLET ORAL 2 TIMES DAILY
Status: DISCONTINUED | OUTPATIENT
Start: 2017-02-08 | End: 2017-02-16 | Stop reason: HOSPADM

## 2017-02-08 RX ADMIN — RISPERIDONE 2 MG: 2 TABLET, FILM COATED ORAL at 19:56

## 2017-02-08 RX ADMIN — RISPERIDONE 1 MG: 1 TABLET, FILM COATED ORAL at 08:31

## 2017-02-08 NOTE — ED NOTES
Pt in no apparent distress, resting comfortably on gurney , visible chest rise and fall noted. Security, ED tech and CNA in room.

## 2017-02-09 PROCEDURE — 700102 HCHG RX REV CODE 250 W/ 637 OVERRIDE(OP): Performed by: PSYCHIATRY & NEUROLOGY

## 2017-02-09 PROCEDURE — A9270 NON-COVERED ITEM OR SERVICE: HCPCS | Performed by: PSYCHIATRY & NEUROLOGY

## 2017-02-09 RX ADMIN — RISPERIDONE 2 MG: 2 TABLET, FILM COATED ORAL at 21:08

## 2017-02-09 RX ADMIN — RISPERIDONE 2 MG: 2 TABLET, FILM COATED ORAL at 08:53

## 2017-02-09 NOTE — ED NOTES
Patient is resting comfortably. Respirations even/unlabored/skin color wnl ethnicity, NAD  Security and Tech x2 direct observation maintained in S.Pod

## 2017-02-09 NOTE — PSYCHIATRY
PSYCHIATRIC FOLLOW-UP NOTE  Supervising Attending: Juan F    ID:   51 y;/o man with amphetamine use disorder and psychosis   S:  Feeling better than he was yesterday. He is talking more. His affect is improved. He is less distracted. He is still hearing voices on and off but they are decreasing.   Over 30 minutes spent in counseling. Pt participated in session focused on increasing psychological flexibility.   Did some values clarification with patient. Helped patient define what a value is for them, and helped patient identify how they are moving toward their values. Also discussed how people often move away from their values when they are responding to uncomfortable thoughts, feelings, and emotions, and invited them to work on moving toward values even when these averse experiences are present.        ROS:  +depression   +psychosis  All other sysems reviewed and are negative.     MSE:  Vitals:   Filed Vitals:    02/08/17 0800 02/08/17 1214 02/08/17 1645 02/08/17 2000   BP: 98/69 105/68 110/78 100/71   Pulse: 76 86 80 85   Temp:    36.8 °C (98.3 °F)   TempSrc:       Resp: 15 16 18 16   Height:       Weight:       SpO2: 98% 94% 94% 95%     Appearance: grooming wnl    Behavior+psychomotor retardation    Speech:slow, blunted tone    Mood:depressed  Affect: constricted    Thought Process:thought blocking    Thought Content  delusions  Cognition: impaired  Memory:impaired  Attention:poor    Judgment:poor    Insight poor   A:  Pyschosis nos, schizoaffective vs mdd with psychosis    Amphetamine use disorder     P:  Increase risperdal to 2mg po bid

## 2017-02-09 NOTE — ED PROVIDER NOTES
"ED Provider Note    12:54 PM    Subjective:  Patient is awaiting transfer to psychiatric facility. He has no complaints. Has been given a meal. Has been receiving medications as prescribed.    Objective: BP 98/68 mmHg  Pulse 84  Temp(Src) 37.1 °C (98.7 °F) (Temporal)  Resp 18  Ht 1.829 m (6' 0.01\")  Wt 79.5 kg (175 lb 4.3 oz)  BMI 23.77 kg/m2  SpO2 94%.  Calm and cooperative. Generally nontoxic. No respiratory distress. No abdominal tenderness. No skin rash. Extremities unremarkable.    Laboratory data was reviewed.    Assesment/Plan:   1. Psychosis-proceed with plan for transfer      Electronically signed by: Aric Marie, 2/9/2017 12:54 PM    "

## 2017-02-10 PROCEDURE — 700102 HCHG RX REV CODE 250 W/ 637 OVERRIDE(OP): Performed by: EMERGENCY MEDICINE

## 2017-02-10 PROCEDURE — A9270 NON-COVERED ITEM OR SERVICE: HCPCS | Performed by: PSYCHIATRY & NEUROLOGY

## 2017-02-10 PROCEDURE — A9270 NON-COVERED ITEM OR SERVICE: HCPCS | Performed by: EMERGENCY MEDICINE

## 2017-02-10 PROCEDURE — 700102 HCHG RX REV CODE 250 W/ 637 OVERRIDE(OP): Performed by: PSYCHIATRY & NEUROLOGY

## 2017-02-10 RX ORDER — ACETAMINOPHEN 325 MG/1
650 TABLET ORAL ONCE
Status: COMPLETED | OUTPATIENT
Start: 2017-02-10 | End: 2017-02-10

## 2017-02-10 RX ADMIN — RISPERIDONE 2 MG: 2 TABLET, FILM COATED ORAL at 11:37

## 2017-02-10 RX ADMIN — RISPERIDONE 2 MG: 2 TABLET, FILM COATED ORAL at 20:51

## 2017-02-10 RX ADMIN — ACETAMINOPHEN 650 MG: 325 TABLET, FILM COATED ORAL at 11:35

## 2017-02-10 NOTE — ED NOTES
Pt up ambulating to bathroom with steady gait. Pt medicated per MAR. Snacks and juice provided. Pt calm and cooperative. Stating no further needs at this time.

## 2017-02-10 NOTE — ED NOTES
Pt resting comfortably on bed. Pt with no changes from previous assessments. Respirations are even and unlabored. Bed in lowest position, Pt in direct view. Pt with no further needs at this time.

## 2017-02-10 NOTE — ED NOTES
Pt resting comfortably on bed. Pt with no changes from previous assessments. Respirations are even and unlabored. Bed in lowest position, sitter at bedside. Pt in direct view. Pt with no further needs at this time.

## 2017-02-10 NOTE — ED NOTES
Pt sleeping with visible rise and fall of chest. Respirations are even and unlabored. NAD. No needs at this time. Security, CNA, and ED tech in place. Rounding complete

## 2017-02-10 NOTE — ED NOTES
"Rounded on pt,  Pt refuses his risperidone. \" I want something for my head ache before I'll take my medication\". Pt refuses continually to take his medication, \" I don't have hallucinations right now\".  "

## 2017-02-10 NOTE — ED PROVIDER NOTES
Patient still awaits transfer to psychiatric facility. He refused to take his Risperdal. I discussed this with the patient. He said that he had a headache and didn't want to take anything on top of his headache. Generalized mild throbbing bitemporal headache. His vital signs are stable. He is neurologically intact. He doesn't have a fever. Sounds like tension headache. I've given him Tylenol and he has agreed to continue to take his medications. We will proceed with plan for transfer to psychiatric facility.

## 2017-02-10 NOTE — ED NOTES
Report received from [facility]. Assumed care of the patient

## 2017-02-10 NOTE — ED NOTES
Pt sleeping with visibile rise and fall of chest. Respirations are even and unlabored. CNA obtaining VS. NAD. CNA, ED tech, and security in place. Rounding complete

## 2017-02-11 PROCEDURE — A9270 NON-COVERED ITEM OR SERVICE: HCPCS | Performed by: PSYCHIATRY & NEUROLOGY

## 2017-02-11 PROCEDURE — 700102 HCHG RX REV CODE 250 W/ 637 OVERRIDE(OP): Performed by: PSYCHIATRY & NEUROLOGY

## 2017-02-11 RX ADMIN — RISPERIDONE 2 MG: 2 TABLET, FILM COATED ORAL at 21:19

## 2017-02-11 RX ADMIN — RISPERIDONE 2 MG: 2 TABLET, FILM COATED ORAL at 08:01

## 2017-02-11 NOTE — ED NOTES
Patient resting in bed with eyes closed, visible rise and fall of chest, NAD, sitter remains outside room.

## 2017-02-11 NOTE — ED NOTES
Break RN  Pt resting in bed comfortably, no acute changes noted. Visible rise and fall of chest. Will continue to monitor.

## 2017-02-11 NOTE — ED NOTES
Patient continues resting in bed, eyes opens, denies needs at this time, sitter remains outside room.

## 2017-02-11 NOTE — ED PROVIDER NOTES
"4:54 AM    Subjective:  Patient is awaiting transfer to psychiatric facility. He states he feels better today. Feels like he is more in his right mind. He denies wanting to harm himself or others. Understands methamphetamine is a problem for him. No complaints of pain.    Objective: /63 mmHg  Pulse 70  Temp(Src) 37.1 °C (98.7 °F) (Temporal)  Resp 14  Ht 1.829 m (6' 0.01\")  Wt 79.5 kg (175 lb 4.3 oz)  BMI 23.77 kg/m2  SpO2 98%. He is cooperative. Cognition still seems somewhat impaired.    Laboratory data was reviewed.    Assesment/Plan:   1. Psychosis-improved. Continue medications. Proceed with plan. Reevaluation by psychiatry.      Electronically signed by: Aric Marie, 2/11/2017 4:54 AM    "

## 2017-02-11 NOTE — ED NOTES
Patient resting in bed, visible rise and fall of chest, sitter outside room, no needs at this time

## 2017-02-11 NOTE — ED NOTES
Report from YUNIOR Ascencio.  Patient resting in bed with eyes closed, even and unlabored respirations, sitter outside room.

## 2017-02-12 PROCEDURE — 700102 HCHG RX REV CODE 250 W/ 637 OVERRIDE(OP): Performed by: EMERGENCY MEDICINE

## 2017-02-12 PROCEDURE — 700102 HCHG RX REV CODE 250 W/ 637 OVERRIDE(OP): Performed by: PSYCHIATRY & NEUROLOGY

## 2017-02-12 PROCEDURE — A9270 NON-COVERED ITEM OR SERVICE: HCPCS | Performed by: PSYCHIATRY & NEUROLOGY

## 2017-02-12 PROCEDURE — A9270 NON-COVERED ITEM OR SERVICE: HCPCS | Performed by: EMERGENCY MEDICINE

## 2017-02-12 RX ORDER — IBUPROFEN 600 MG/1
600 TABLET ORAL ONCE
Status: COMPLETED | OUTPATIENT
Start: 2017-02-12 | End: 2017-02-12

## 2017-02-12 RX ADMIN — IBUPROFEN 600 MG: 600 TABLET, FILM COATED ORAL at 14:27

## 2017-02-12 RX ADMIN — RISPERIDONE 2 MG: 2 TABLET, FILM COATED ORAL at 21:51

## 2017-02-12 RX ADMIN — RISPERIDONE 2 MG: 2 TABLET, FILM COATED ORAL at 08:00

## 2017-02-12 ASSESSMENT — LIFESTYLE VARIABLES
HAVE YOU EVER FELT YOU SHOULD CUT DOWN ON YOUR DRINKING: NO
TOTAL SCORE: 0
HAVE PEOPLE ANNOYED YOU BY CRITICIZING YOUR DRINKING: NO
EVER HAD A DRINK FIRST THING IN THE MORNING TO STEADY YOUR NERVES TO GET RID OF A HANGOVER: NO
TOTAL SCORE: 0
EVER FELT BAD OR GUILTY ABOUT YOUR DRINKING: NO
DO YOU DRINK ALCOHOL: YES
CONSUMPTION TOTAL: INCOMPLETE
TOTAL SCORE: 0

## 2017-02-12 ASSESSMENT — PAIN SCALES - GENERAL
PAINLEVEL_OUTOF10: 7
PAINLEVEL_OUTOF10: 0
PAINLEVEL_OUTOF10: 0

## 2017-02-12 NOTE — ED NOTES
Pt arousable at this time.  Slightly hesitant about taking meds but took it willingly at this time.  Pleasant with this nurse.  No changes.  Resting at this time.

## 2017-02-12 NOTE — ED PROVIDER NOTES
ED PROVIDER NOTE    Scribed for Zion Mcgill M.D. by Elizabet Pereira. 2/12/2017, 7:27 AM.    This is an addendum to the note on Ulises Matthews. For further details and full chart entry, see the previously signed ED Provider Note written by Dr. Cochran (ERP).      5:38 AM - I discussed the patient's case with Dr. Perrin (ERP) who will transfer care of the patient to me at this time.        The patient continues to wake transfer to an inpatient psychiatric facility.    DISPOSITION:  Patient will be signed out to the oncoming physician pending transfer to the inpatient psychiatric facility.     FINAL IMPRESSION   1. Methamphetamine abuse      I, Elizabet Pereira (Scribe), am scribing for, and in the presence of, Zion Mcgill M.D..    Electronically signed by: Elizabet Pereira (Scribe), 2/12/2017    IZion M.D. personally performed the services described in this documentation, as scribed by Elizabet Pereira in my presence, and it is both accurate and complete.    The note accurately reflects work and decisions made by me.  Zion Mcgill  2/12/2017  11:19 AM

## 2017-02-12 NOTE — ED NOTES
Patient medicated at this time.  Up to the restroom.  Meal tray bedside.  Provided toothbrush and toothpaste.  Denies further needs.  Vitals updated

## 2017-02-12 NOTE — ED NOTES
Vitals updated.  Patient requested pain medicine for headache.  Dr. Alvarado notified.  Denies other needs

## 2017-02-12 NOTE — ED NOTES
Patient requests to go back to mt pod.  Notified that if bed opens up he will go there if he continues to behave.

## 2017-02-13 PROCEDURE — 700102 HCHG RX REV CODE 250 W/ 637 OVERRIDE(OP): Performed by: PSYCHIATRY & NEUROLOGY

## 2017-02-13 PROCEDURE — A9270 NON-COVERED ITEM OR SERVICE: HCPCS | Performed by: PSYCHIATRY & NEUROLOGY

## 2017-02-13 RX ADMIN — RISPERIDONE 2 MG: 2 TABLET, FILM COATED ORAL at 22:06

## 2017-02-13 RX ADMIN — RISPERIDONE 2 MG: 2 TABLET, FILM COATED ORAL at 09:00

## 2017-02-13 NOTE — ED PROVIDER NOTES
ED Provider Note Addendum    Scribed for Zion Mcgill M.D. by Zarina Hill on 2/13/2017 at 10:52 AM.     This is an addendum to the note on Ulises Matthews.  For further details and full chart information, see patient's initial note.       4:09 AM - I discussed the patient's case with Dr. Camarena ([facility]) who will transfer care of the patient to me at this time.        11:31 AM - The patient has done well during my period of observation. They are resting comfortably. They continue to await transfer to an inpatient psychiatric facility.     Disposition:  Patient will be transferred to an appropriate psychiatric facility in stable condition for further psychiatric care and evaluation.  Patient will be placed under the care of my partner awaiting transfer.    FINAL IMPRESSION  1. Methamphetamine abuse         IZarina (Scribe), am scribing for, and in the presence of, Zion Mcgill M.D..    Electronically signed by: Zarina Hill (Scribe), 2/13/2017    IZion M.D. personally performed the services described in this documentation, as scribed by Zarina Hill in my presence, and it is both accurate and complete.    The note accurately reflects work and decisions made by me.  Zion Mcgill  2/13/2017  11:23 AM

## 2017-02-13 NOTE — ED NOTES
Patient medicated per orders. Calm, cooperative, and conversing appropriately with staff. No restlessness/agitation noted. Close observation maintained.

## 2017-02-13 NOTE — ED NOTES
Patient sleeping on gurney. No acute distress. Active chest rise and fall noted. No restlessness/agitation noted. Close observation remains in progress.

## 2017-02-14 PROCEDURE — 700102 HCHG RX REV CODE 250 W/ 637 OVERRIDE(OP): Performed by: PSYCHIATRY & NEUROLOGY

## 2017-02-14 PROCEDURE — A9270 NON-COVERED ITEM OR SERVICE: HCPCS | Performed by: PSYCHIATRY & NEUROLOGY

## 2017-02-14 RX ADMIN — RISPERIDONE 2 MG: 2 TABLET, FILM COATED ORAL at 20:53

## 2017-02-14 RX ADMIN — RISPERIDONE 2 MG: 2 TABLET, FILM COATED ORAL at 09:21

## 2017-02-14 NOTE — ED NOTES
RN rounding done on pt. Pt denies needs at this time. Report given to [facility] on pt. Waiting for acceptance from physician.

## 2017-02-14 NOTE — DISCHARGE PLANNING
Received call from [facility]. Pt has been declined due to pt reporting he is not SI, he wants to enter a 28 day program for meth abuse. Faxed information to [facility]. Received fax confirmation.    Awaiting acceptance to [facility].

## 2017-02-14 NOTE — ED NOTES
Patient to be transferred to [facility]. Security called. Discussed with Charge YUNIOR Bear.

## 2017-02-14 NOTE — ED NOTES
Patient transferred to [facility] per orders accompanied by ED RN and Security.

## 2017-02-14 NOTE — ED NOTES
RN rounding done, pt medicated per MD order. Resting on hospital bed with no needs, no complaints.

## 2017-02-14 NOTE — DISCHARGE PLANNING
Received Court filed copy of legal hold extension. Placed copy on chart and with referral. Faxed to [facility] and [facility].     Pt to appear for consultation with  Wednesday through Legal Hold Court.

## 2017-02-14 NOTE — ED NOTES
Patient resting on gurney. No acute distress. No restlessness/agitation noted. Calm and cooperative. Conversing appropriately with staff. Close observation maintained.

## 2017-02-14 NOTE — ED PROVIDER NOTES
ED Provider Note Addendum    Scribed for Zion Mcgill M.D. by Gadiel Rosas on 2/14/2017 at 11:13 AM.     This is an addendum to the note on Ulises Matthews.  For further details and full chart information, see patient's initial note.       4:00 AM - I discussed the patient's case with Dr. Camarena ([facility]) who will transfer care of the patient to me at this time.        11:13 AM  - The patient has done well during my period of observation. They are resting comfortably. They continue to await transfer to an inpatient psychiatric facility.       Disposition:  Patient will be transferred to an appropriate psychiatric facility in stable condition for further psychiatric care and evaluation.  Patient will be placed under the care of my partner awaiting transfer.    FINAL IMPRESSION  1. Methamphetamine abuse         Gadiel NOEL (Scribe), am scribing for, and in the presence of, Zion Mcgill M.D..    Electronically signed by: Gadiel Rosas (Scribe), 2/14/2017    Zion NOEL M.D. personally performed the services described in this documentation, as scribed by Gadiel Rosas in my presence, and it is both accurate and complete.    The note accurately reflects work and decisions made by me.  Zion Mcgill  2/14/2017  12:05 PM

## 2017-02-15 PROCEDURE — A9270 NON-COVERED ITEM OR SERVICE: HCPCS | Performed by: PSYCHIATRY & NEUROLOGY

## 2017-02-15 PROCEDURE — 700102 HCHG RX REV CODE 250 W/ 637 OVERRIDE(OP): Performed by: PSYCHIATRY & NEUROLOGY

## 2017-02-15 RX ADMIN — RISPERIDONE 2 MG: 2 TABLET, FILM COATED ORAL at 21:08

## 2017-02-15 RX ADMIN — RISPERIDONE 2 MG: 2 TABLET, FILM COATED ORAL at 09:20

## 2017-02-15 NOTE — ED NOTES
Pt medicated per MAR with nightly medications. Pt amb to RN with steady gait. CNA, ED Tech, and security in place.

## 2017-02-15 NOTE — ED NOTES
Pt sleeping on right side  in bed. Resp are even and unlabored. NAD. CNA, ED tech, and Security in place

## 2017-02-15 NOTE — DISCHARGE PLANNING
Medical Social Work    Pt met with  for Legal Hold Consultation. Pt will continue on legal hold for one week, until 02.22.2017.

## 2017-02-15 NOTE — ED PROVIDER NOTES
I checked on the patient. He has no complaints. His vital signs are stable. We will proceed with plan for transfer.

## 2017-02-16 VITALS
TEMPERATURE: 98.5 F | HEIGHT: 72 IN | DIASTOLIC BLOOD PRESSURE: 82 MMHG | HEART RATE: 81 BPM | SYSTOLIC BLOOD PRESSURE: 114 MMHG | WEIGHT: 175.27 LBS | BODY MASS INDEX: 23.74 KG/M2 | OXYGEN SATURATION: 96 % | RESPIRATION RATE: 18 BRPM

## 2017-02-16 PROCEDURE — A9270 NON-COVERED ITEM OR SERVICE: HCPCS | Performed by: PSYCHIATRY & NEUROLOGY

## 2017-02-16 PROCEDURE — 700102 HCHG RX REV CODE 250 W/ 637 OVERRIDE(OP): Performed by: PSYCHIATRY & NEUROLOGY

## 2017-02-16 RX ADMIN — RISPERIDONE 2 MG: 2 TABLET, FILM COATED ORAL at 08:54

## 2017-02-16 NOTE — ED PROVIDER NOTES
Patient continues to wait for psychiatric transfer. He doesn't have any complaints and medically he is feeling fine. Vital signs are normal. Physical exam unremarkable. We will proceed with plan

## 2017-02-16 NOTE — ED NOTES
Report received from [facility].  Pt currently sleeping comfortably with even and unlabored breathing.

## 2017-02-16 NOTE — ED NOTES
Rounded on pt. Pt resting in bed in NAD. No needs at this time. Wristband faded - will bring a new one when rounding next.

## 2017-02-16 NOTE — DISCHARGE PLANNING
MSW received call from Charlie at [facility], pt's packet is complete but because pt does have insurance is not their priority at this time. MSW called Dr. Ortiz for re-evaluation after [place] denied pt.

## 2017-02-17 NOTE — ED NOTES
Discharge instructions given, pt verbalized understanding.  Prescription instructions given, pt verbalized understanding.  Pt given bus pass.  A&ox4.  VSS.  Ambulates out of ER.

## 2017-02-17 NOTE — ED PROVIDER NOTES
ED PROVIDER NOTE    Scribed for Sulaiman Osorio M.D. by Anthony Davis. 2/16/2017, 5:06 PM.    This is an addendum to the note on Ulises Matthews. For further details and full chart entry, see the previously signed ED Provider Note written by Dr. Cochran ([facility]).     1:23 PM- Patient's case was transferred into my care by Dr. Marie. See previously signed note for further details.     5:07 PM Nursing staff informed me that the patient's legal hold is discontinued as he is no longer suicidal. The patient will return for new or worsening symptoms and is stable at the time of discharge.    The patient is referred to a primary physician for blood pressure management, diabetic screening, and for all other preventative health concerns.    DISPOSITION:  Patient will be discharged home in stable condition.    FINAL IMPRESSION   1. Methamphetamine abuse      Anthony NOEL (Scribe), am scribing for, and in the presence of, Sulaiman Osorio M.D..    Electronically signed by: Anthony Davis (Miranda), 2/16/2017    Sulaiman NOEL M.D. personally performed the services described in this documentation, as scribed by Anthony Davis in my presence, and it is both accurate and complete.    The note accurately reflects work and decisions made by me.  Sulaiman Osorio  2/16/2017  7:57 PM

## 2017-02-28 NOTE — PSYCHIATRY
PSYCHIATRIC FOLLOW-UP NOTE  Supervising Attending: Juan F    ID:   52 y/o man with a history of schizophrenia     S:  Doing much better. Affect is normalized. Able to talk about more abstract concepts. No suicidal ideation. No hallucinations at this time. Thought blocking seems to have stopped. He is thinking about his future and plans for where he wants to live. Has been working with a counselor at the shelter and wants to go back there.     Did some values clarification with patient. Helped patient define what a value is for them, and helped patient identify how they are moving toward their values. Also discussed how people often move away from their values when they are responding to uncomfortable thoughts, feelings, and emotions, and invited them to work on moving toward values even when these averse experiences are present.    Over 30 minutes spent in counseling. Pt participated in session focused on increasing psychological flexibility.     ROS:  Depression improved significantly   Psychosis improved   All other systems reviewed and are negative.     MSE:  Vitals:  Filed Vitals:    02/16/17 0800 02/16/17 1200 02/16/17 1600 02/16/17 1806   BP: 104/74 108/68 113/72 114/82   Pulse: 75 69 71 81   Temp: 36.8 °C (98.3 °F) 36.8 °C (98.3 °F) 36.8 °C (98.3 °F) 36.9 °C (98.5 °F)   TempSrc:       Resp: 16 18 18 18   Height:       Weight:       SpO2:   96% 96%       Appearance: grooming wnl   Behavior:no psychomotor agitation or retardation   Speech:nl tone, rate, volume   Mood:good   Affect:full and congruent   Thought Process:logical and sequential   Thought Content no si/hi jesus manuel v/h   Cognition: improvied  Memory:wnl  Attention:wnl  Judgment:improved  Insightimproved      A:  schizoprenia    P:  D/c hold  Given scripts for medication   willi f/u with outpatient psychiatry, resources given  Resources given for local addictions treatment

## 2017-06-01 ENCOUNTER — APPOINTMENT (OUTPATIENT)
Dept: RADIOLOGY | Facility: MEDICAL CENTER | Age: 52
End: 2017-06-01
Attending: INTERNAL MEDICINE
Payer: MEDICAID

## 2017-06-01 ENCOUNTER — HOSPITAL ENCOUNTER (OUTPATIENT)
Facility: MEDICAL CENTER | Age: 52
End: 2017-06-02
Attending: EMERGENCY MEDICINE | Admitting: INTERNAL MEDICINE
Payer: MEDICAID

## 2017-06-01 ENCOUNTER — RESOLUTE PROFESSIONAL BILLING HOSPITAL PROF FEE (OUTPATIENT)
Dept: HOSPITALIST | Facility: MEDICAL CENTER | Age: 52
End: 2017-06-01
Payer: MEDICAID

## 2017-06-01 ENCOUNTER — APPOINTMENT (OUTPATIENT)
Dept: RADIOLOGY | Facility: MEDICAL CENTER | Age: 52
End: 2017-06-01
Attending: EMERGENCY MEDICINE
Payer: MEDICAID

## 2017-06-01 DIAGNOSIS — R55 SYNCOPE, UNSPECIFIED SYNCOPE TYPE: ICD-10-CM

## 2017-06-01 DIAGNOSIS — I95.9 HYPOTENSION, UNSPECIFIED HYPOTENSION TYPE: ICD-10-CM

## 2017-06-01 DIAGNOSIS — R94.31 ABNORMAL EKG: ICD-10-CM

## 2017-06-01 LAB
ALBUMIN SERPL BCP-MCNC: 4.6 G/DL (ref 3.2–4.9)
ALBUMIN/GLOB SERPL: 1.4 G/DL
ALP SERPL-CCNC: 90 U/L (ref 30–99)
ALT SERPL-CCNC: 29 U/L (ref 2–50)
AMPHET UR QL SCN: NEGATIVE
ANION GAP SERPL CALC-SCNC: 14 MMOL/L (ref 0–11.9)
APTT PPP: 23.2 SEC (ref 24.7–36)
AST SERPL-CCNC: 41 U/L (ref 12–45)
BARBITURATES UR QL SCN: NEGATIVE
BASOPHILS # BLD AUTO: 0.8 % (ref 0–1.8)
BASOPHILS # BLD: 0.07 K/UL (ref 0–0.12)
BENZODIAZ UR QL SCN: NEGATIVE
BILIRUB SERPL-MCNC: 0.7 MG/DL (ref 0.1–1.5)
BNP SERPL-MCNC: <2 PG/ML (ref 0–100)
BUN SERPL-MCNC: 13 MG/DL (ref 8–22)
BZE UR QL SCN: NEGATIVE
CALCIUM SERPL-MCNC: 9.7 MG/DL (ref 8.5–10.5)
CANNABINOIDS UR QL SCN: POSITIVE
CHLORIDE SERPL-SCNC: 102 MMOL/L (ref 96–112)
CO2 SERPL-SCNC: 22 MMOL/L (ref 20–33)
CREAT SERPL-MCNC: 1.28 MG/DL (ref 0.5–1.4)
EKG IMPRESSION: NORMAL
EOSINOPHIL # BLD AUTO: 0.25 K/UL (ref 0–0.51)
EOSINOPHIL NFR BLD: 2.8 % (ref 0–6.9)
ERYTHROCYTE [DISTWIDTH] IN BLOOD BY AUTOMATED COUNT: 45.7 FL (ref 35.9–50)
ETHANOL BLD-MCNC: 0.01 G/DL
GFR SERPL CREATININE-BSD FRML MDRD: 59 ML/MIN/1.73 M 2
GLOBULIN SER CALC-MCNC: 3.3 G/DL (ref 1.9–3.5)
GLUCOSE SERPL-MCNC: 133 MG/DL (ref 65–99)
HCT VFR BLD AUTO: 44.4 % (ref 42–52)
HGB BLD-MCNC: 15 G/DL (ref 14–18)
IMM GRANULOCYTES # BLD AUTO: 0.03 K/UL (ref 0–0.11)
IMM GRANULOCYTES NFR BLD AUTO: 0.3 % (ref 0–0.9)
INR PPP: 1 (ref 0.87–1.13)
LYMPHOCYTES # BLD AUTO: 3.05 K/UL (ref 1–4.8)
LYMPHOCYTES NFR BLD: 33.9 % (ref 22–41)
MCH RBC QN AUTO: 31.9 PG (ref 27–33)
MCHC RBC AUTO-ENTMCNC: 33.8 G/DL (ref 33.7–35.3)
MCV RBC AUTO: 94.5 FL (ref 81.4–97.8)
MDMA UR QL SCN: NEGATIVE
METHADONE UR QL SCN: NEGATIVE
MONOCYTES # BLD AUTO: 0.53 K/UL (ref 0–0.85)
MONOCYTES NFR BLD AUTO: 5.9 % (ref 0–13.4)
NEUTROPHILS # BLD AUTO: 5.07 K/UL (ref 1.82–7.42)
NEUTROPHILS NFR BLD: 56.3 % (ref 44–72)
NRBC # BLD AUTO: 0 K/UL
NRBC BLD AUTO-RTO: 0 /100 WBC
OPIATES UR QL SCN: NEGATIVE
OXYCODONE UR QL SCN: NEGATIVE
PCP UR QL SCN: NEGATIVE
PLATELET # BLD AUTO: 251 K/UL (ref 164–446)
PMV BLD AUTO: 9.8 FL (ref 9–12.9)
POTASSIUM SERPL-SCNC: 3.6 MMOL/L (ref 3.6–5.5)
PROPOXYPH UR QL SCN: NEGATIVE
PROT SERPL-MCNC: 7.9 G/DL (ref 6–8.2)
PROTHROMBIN TIME: 13.5 SEC (ref 12–14.6)
RBC # BLD AUTO: 4.7 M/UL (ref 4.7–6.1)
SODIUM SERPL-SCNC: 138 MMOL/L (ref 135–145)
TROPONIN I SERPL-MCNC: <0.01 NG/ML (ref 0–0.04)
WBC # BLD AUTO: 9 K/UL (ref 4.8–10.8)

## 2017-06-01 PROCEDURE — 85730 THROMBOPLASTIN TIME PARTIAL: CPT

## 2017-06-01 PROCEDURE — 71010 DX-CHEST-PORTABLE (1 VIEW): CPT

## 2017-06-01 PROCEDURE — 99407 BEHAV CHNG SMOKING > 10 MIN: CPT | Performed by: INTERNAL MEDICINE

## 2017-06-01 PROCEDURE — 93005 ELECTROCARDIOGRAM TRACING: CPT

## 2017-06-01 PROCEDURE — 96360 HYDRATION IV INFUSION INIT: CPT

## 2017-06-01 PROCEDURE — 70450 CT HEAD/BRAIN W/O DYE: CPT

## 2017-06-01 PROCEDURE — 84484 ASSAY OF TROPONIN QUANT: CPT

## 2017-06-01 PROCEDURE — 36415 COLL VENOUS BLD VENIPUNCTURE: CPT

## 2017-06-01 PROCEDURE — 700105 HCHG RX REV CODE 258: Performed by: INTERNAL MEDICINE

## 2017-06-01 PROCEDURE — 80307 DRUG TEST PRSMV CHEM ANLYZR: CPT

## 2017-06-01 PROCEDURE — G0378 HOSPITAL OBSERVATION PER HR: HCPCS

## 2017-06-01 PROCEDURE — 93005 ELECTROCARDIOGRAM TRACING: CPT | Performed by: EMERGENCY MEDICINE

## 2017-06-01 PROCEDURE — 80053 COMPREHEN METABOLIC PANEL: CPT

## 2017-06-01 PROCEDURE — 85025 COMPLETE CBC W/AUTO DIFF WBC: CPT

## 2017-06-01 PROCEDURE — 83880 ASSAY OF NATRIURETIC PEPTIDE: CPT

## 2017-06-01 PROCEDURE — 85610 PROTHROMBIN TIME: CPT

## 2017-06-01 PROCEDURE — 700105 HCHG RX REV CODE 258: Performed by: EMERGENCY MEDICINE

## 2017-06-01 PROCEDURE — 96361 HYDRATE IV INFUSION ADD-ON: CPT

## 2017-06-01 PROCEDURE — 99285 EMERGENCY DEPT VISIT HI MDM: CPT

## 2017-06-01 PROCEDURE — 99220 PR INITIAL OBSERVATION CARE,LEVL III: CPT | Mod: 25 | Performed by: INTERNAL MEDICINE

## 2017-06-01 RX ORDER — POLYETHYLENE GLYCOL 3350 17 G/17G
1 POWDER, FOR SOLUTION ORAL
Status: DISCONTINUED | OUTPATIENT
Start: 2017-06-02 | End: 2017-06-02 | Stop reason: HOSPADM

## 2017-06-01 RX ORDER — PROMETHAZINE HYDROCHLORIDE 25 MG/1
12.5-25 TABLET ORAL EVERY 4 HOURS PRN
Status: DISCONTINUED | OUTPATIENT
Start: 2017-06-01 | End: 2017-06-02 | Stop reason: HOSPADM

## 2017-06-01 RX ORDER — AMOXICILLIN 250 MG
2 CAPSULE ORAL 2 TIMES DAILY
Status: DISCONTINUED | OUTPATIENT
Start: 2017-06-01 | End: 2017-06-02 | Stop reason: HOSPADM

## 2017-06-01 RX ORDER — PROMETHAZINE HYDROCHLORIDE 12.5 MG/1
12.5-25 SUPPOSITORY RECTAL EVERY 4 HOURS PRN
Status: DISCONTINUED | OUTPATIENT
Start: 2017-06-01 | End: 2017-06-02 | Stop reason: HOSPADM

## 2017-06-01 RX ORDER — ONDANSETRON 2 MG/ML
4 INJECTION INTRAMUSCULAR; INTRAVENOUS EVERY 4 HOURS PRN
Status: DISCONTINUED | OUTPATIENT
Start: 2017-06-01 | End: 2017-06-02 | Stop reason: HOSPADM

## 2017-06-01 RX ORDER — HEPARIN SODIUM 5000 [USP'U]/ML
5000 INJECTION, SOLUTION INTRAVENOUS; SUBCUTANEOUS EVERY 8 HOURS
Status: DISCONTINUED | OUTPATIENT
Start: 2017-06-02 | End: 2017-06-02 | Stop reason: HOSPADM

## 2017-06-01 RX ORDER — ONDANSETRON 4 MG/1
4 TABLET, ORALLY DISINTEGRATING ORAL EVERY 4 HOURS PRN
Status: DISCONTINUED | OUTPATIENT
Start: 2017-06-01 | End: 2017-06-02 | Stop reason: HOSPADM

## 2017-06-01 RX ORDER — BISACODYL 10 MG
10 SUPPOSITORY, RECTAL RECTAL
Status: DISCONTINUED | OUTPATIENT
Start: 2017-06-02 | End: 2017-06-02 | Stop reason: HOSPADM

## 2017-06-01 RX ORDER — SODIUM CHLORIDE 9 MG/ML
INJECTION, SOLUTION INTRAVENOUS CONTINUOUS
Status: DISCONTINUED | OUTPATIENT
Start: 2017-06-01 | End: 2017-06-02

## 2017-06-01 RX ORDER — ACETAMINOPHEN 325 MG/1
650 TABLET ORAL EVERY 6 HOURS PRN
Status: DISCONTINUED | OUTPATIENT
Start: 2017-06-01 | End: 2017-06-02 | Stop reason: HOSPADM

## 2017-06-01 RX ORDER — SODIUM CHLORIDE 9 MG/ML
1000 INJECTION, SOLUTION INTRAVENOUS ONCE
Status: COMPLETED | OUTPATIENT
Start: 2017-06-01 | End: 2017-06-01

## 2017-06-01 RX ORDER — IBUPROFEN 200 MG
400 TABLET ORAL EVERY 6 HOURS PRN
COMMUNITY
End: 2017-10-29

## 2017-06-01 RX ADMIN — SODIUM CHLORIDE: 9 INJECTION, SOLUTION INTRAVENOUS at 14:22

## 2017-06-01 RX ADMIN — SODIUM CHLORIDE 1000 ML: 9 INJECTION, SOLUTION INTRAVENOUS at 11:04

## 2017-06-01 RX ADMIN — SODIUM CHLORIDE: 9 INJECTION, SOLUTION INTRAVENOUS at 23:56

## 2017-06-01 ASSESSMENT — PAIN SCALES - GENERAL
PAINLEVEL_OUTOF10: 0

## 2017-06-01 ASSESSMENT — LIFESTYLE VARIABLES
ANXIETY: NO ANXIETY (AT EASE)
TOTAL SCORE: 0
EVER_SMOKED: YES
AGITATION: NORMAL ACTIVITY
HOW MANY TIMES IN THE PAST YEAR HAVE YOU HAD 5 OR MORE DRINKS IN A DAY: 200
HEADACHE, FULLNESS IN HEAD: NOT PRESENT
TOTAL SCORE: 3
CONSUMPTION TOTAL: POSITIVE
HAVE YOU EVER FELT YOU SHOULD CUT DOWN ON YOUR DRINKING: YES
NAUSEA AND VOMITING: NO NAUSEA AND NO VOMITING
AUDITORY DISTURBANCES: NOT PRESENT
HAVE PEOPLE ANNOYED YOU BY CRITICIZING YOUR DRINKING: NO
EVER FELT BAD OR GUILTY ABOUT YOUR DRINKING: YES
TREMOR: NO TREMOR
ORIENTATION AND CLOUDING OF SENSORIUM: ORIENTED AND CAN DO SERIAL ADDITIONS
DOES PATIENT WANT TO STOP DRINKING: YES
VISUAL DISTURBANCES: NOT PRESENT
PAROXYSMAL SWEATS: NO SWEAT VISIBLE
TOTAL SCORE: 3
EVER HAD A DRINK FIRST THING IN THE MORNING TO STEADY YOUR NERVES TO GET RID OF A HANGOVER: YES
ALCOHOL_USE: YES
AVERAGE NUMBER OF DAYS PER WEEK YOU HAVE A DRINK CONTAINING ALCOHOL: 5
ON A TYPICAL DAY WHEN YOU DRINK ALCOHOL HOW MANY DRINKS DO YOU HAVE: 6
TOTAL SCORE: 3

## 2017-06-01 ASSESSMENT — PAIN SCALES - WONG BAKER
WONGBAKER_NUMERICALRESPONSE: DOESN'T HURT AT ALL
WONGBAKER_NUMERICALRESPONSE: DOESN'T HURT AT ALL

## 2017-06-01 NOTE — DISCHARGE PLANNING
Care Transition Team Assessment    Information Source  Orientation : Oriented x 4  Information Given By: Patient  Who is responsible for making decisions for patient? : Patient    Readmission Evaluation  Is this a readmission?: No    Elopement Risk  Legal Hold: No  Ambulatory or Self Mobile in Wheelchair: No-Not an Elopement Risk    Interdisciplinary Discharge Planning  Does Admitting Nurse Feel This Could be a Complex Discharge?: No  Primary Care Physician: none  Lives with - Patient's Self Care Capacity: Alone and Able to Care For Self  Support Systems: None  Housing / Facility: Homeless  Do You Take your Prescribed Medications Regularly: Yes  Able to Return to Previous ADL's: Yes  Mobility Issues: No  Prior Services: None  Patient Expects to be Discharged to::  (homeless shelter)  Assistance Needed: No  Durable Medical Equipment: Other - Specify (cane)    Discharge Preparedness  What is your plan after discharge?: Other (comment) (back to homeless shelter)  What are your discharge supports?: Other (comment) (friends)  Prior Functional Level: Ambulatory  Difficulity with ADLs: None  Difficulity with IADLs: None    Functional Assesment  Prior Functional Level: Ambulatory    Finances  Financial Barriers to Discharge: Yes  Prescription Coverage: Yes    Vision / Hearing Impairment  Vision Impairment : No  Hearing Impairment : No    Values / Beliefs / Concerns  Values / Beliefs Concerns : No    Advance Directive  Advance Directive?: None  Advance Directive offered?: AD Booklet refused    Domestic Abuse  Have you ever been the victim of abuse or violence?: No  Physical Abuse or Sexual Abuse: No  Verbal Abuse or Emotional Abuse: No    Psychological Assessment  History of Substance Abuse: None  History of Psychiatric Problems: No  Non-compliant with Treatment: No  Newly Diagnosed Illness: No    Discharge Risks or Barriers  Discharge risks or barriers?: No PCP  Patient risk factors: Homeless    Anticipated Discharge  Information  Anticipated discharge disposition: Shelter

## 2017-06-01 NOTE — IP AVS SNAPSHOT
Home Care Instructions                                                                                                                  Name:Ulises Matthews  Medical Record Number:[id number]  CSN: [id number]    YOB: 1965   Age: 51 y.o.  Sex: male  HT:1.829 m (6') WT: 90.9 kg (200 lb 6.4 oz)          Admit Date: 6/1/2017     Discharge Date:   Today's Date: 6/2/2017  Attending Doctor:  Jayden Ritter M.D.                  Allergies:  Review of patient's allergies indicates no known allergies.            Discharge Instructions       Discharge Instructions    Discharged to home by car with relative. Discharged via wheelchair, hospital escort: Yes.  Special equipment needed: Not Applicable    Be sure to schedule a follow-up appointment with your primary care doctor or any specialists as instructed.     Discharge Plan:   Diet Plan: Discussed  Activity Level: Discussed  Smoking Cessation Offered: Patient Counseled  Confirmed Follow up Appointment: Patient to Call and Schedule Appointment  Confirmed Symptoms Management: Discussed  Medication Reconciliation Updated: Yes  Influenza Vaccine Indication: Indicated: Not available from distributor/    I understand that a diet low in cholesterol, fat, and sodium is recommended for good health. Unless I have been given specific instructions below for another diet, I accept this instruction as my diet prescription.   Other diet: Regular    Special Instructions: None    · Is patient discharged on Warfarin / Coumadin?   No     · Is patient Post Blood Transfusion?  No    Depression / Suicide Risk    As you are discharged from [facility], it is important to learn how to keep safe from harming yourself.    Recognize the warning signs:  · Abrupt changes in personality, positive or negative- including increase in energy   · Giving away possessions  · Change in eating patterns- significant weight changes-  positive or negative  · Change in sleeping  patterns- unable to sleep or sleeping all the time   · Unwillingness or inability to communicate  · Depression  · Unusual sadness, discouragement and loneliness  · Talk of wanting to die  · Neglect of personal appearance   · Rebelliousness- reckless behavior  · Withdrawal from people/activities they love  · Confusion- inability to concentrate     If you or a loved one observes any of these behaviors or has concerns about self-harm, here's what you can do:  · Talk about it- your feelings and reasons for harming yourself  · Remove any means that you might use to hurt yourself (examples: pills, rope, extension cords, firearm)  · Get professional help from the community (Mental Health, Substance Abuse, psychological counseling)  · Do not be alone:Call your Safe Contact- someone whom you trust who will be there for you.  · Call your local CRISIS HOTLINE 355-5745 or 649-835-7744  · Call your local Children's Mobile Crisis Response Team [place] (557) 304-6846 or www.AuditFile  · Call the toll free National Suicide Prevention Hotlines   · National Suicide Prevention Lifeline 032-714-KWCJ (9814)  · National Hope Line Network 800-SUICIDE (581-9099)           Discharge Medication Instructions:    Below are the medications your physician expects you to take upon discharge:    Review all your home medications and newly ordered medications with your doctor and/or pharmacist. Follow medication instructions as directed by your doctor and/or pharmacist.    Please keep your medication list with you and share with your physician.               Medication List      CONTINUE taking these medications        Instructions    Morning Afternoon Evening Bedtime    ibuprofen 200 MG Tabs   Commonly known as:  MOTRIN        Take 400 mg by mouth every 6 hours as needed.   Dose:  400 mg                          STOP taking these medications     NORCO PO                       Instructions           Diet / Nutrition:    Follow any diet  instructions given to you by your doctor or the dietician, including how much salt (sodium) you are allowed each day.    If you are overweight, talk to your doctor about a weight reduction plan.    Activity:    Remain physically active following your doctor's instructions about exercise and activity.    Rest often.     Any time you become even a little tired or short of breath, SIT DOWN and rest.    Worsening Symptoms:    Report any of the following signs and symptoms to the doctor's office immediately:    *Pain of jaw, arm, or neck  *Chest pain not relieved by medication                               *Dizziness or loss of consciousness  *Difficulty breathing even when at rest   *More tired than usual                                       *Bleeding drainage or swelling of surgical site  *Swelling of feet, ankles, legs or stomach                 *Fever (>100ºF)  *Pink or blood tinged sputum  *Weight gain (3lbs/day or 5lbs /week)           *Shock from internal defibrillator (if applicable)  *Palpitations or irregular heartbeats                *Cool and/or numb extremities    Stroke Awareness    Common Risk Factors for Stroke include:    Age  Atrial Fibrillation  Carotid Artery Stenosis  Diabetes Mellitus  Excessive alcohol consumption  High blood pressure  Overweight   Physical inactivity  Smoking    Warning signs and symptoms of a stroke include:    *Sudden numbness or weakness of the face, arm or leg (especially on one side of the body).  *Sudden confusion, trouble speaking or understanding.  *Sudden trouble seeing in one or both eyes.  *Sudden trouble walking, dizziness, loss of balance or coordination.Sudden severe headache with no known cause.    It is very important to get treatment quickly when a stroke occurs. If you experience any of the above warning signs, call 911 immediately.                   Disclaimer         Quit Smoking / Tobacco Use:    I understand the use of any tobacco products increases my  chance of suffering from future heart disease or stroke and could cause other illnesses which may shorten my life. Quitting the use of tobacco products is the single most important thing I can do to improve my health. For further information on smoking / tobacco cessation call a Toll Free Quit Line at 1-134.909.3477 (*National Cancer [place]) or 1-796.605.7631 (American Lung Association) or you can access the web based program at www.lungusa.org.    [place] Tobacco Users Help Line:  (343) 625-3869       Toll Free: 1-814.414.1275  Quit Tobacco Program [facility] (211)544-2605    Crisis Hotline:    [place] Crisis Hotline:  5-930-FBAXWQG or 1-265.180.7018    [place] Crisis Hotline:    1-719.229.7709 or 089-817-8025    Discharge Survey:   Thank you for choosing [facility]. We hope we did everything we could to make your hospital stay a pleasant one. You may be receiving a phone survey and we would appreciate your time and participation in answering the questions. Your input is very valuable to us in our efforts to improve our service to our patients and their families.        My signature on this form indicates that:    1. I have reviewed and understand the above information.  2. My questions regarding this information have been answered to my satisfaction.  3. I have formulated a plan with my discharge nurse to obtain my prescribed medications for home.                  Disclaimer         __________________________________                     __________       ________                       Patient Signature                                                 Date                    Time

## 2017-06-01 NOTE — ED NOTES
The Medication Reconciliation process has been completed by interviewing the patient.    Allergies have been reviewed  Antibiotic use in 30 days - NONE    Home Pharmacy:  NONE

## 2017-06-01 NOTE — ED NOTES
Chief Complaint   Patient presents with   • Syncope       Pt BIB EMS for syncopal episode. Found sitting on ground lethargic. Pt BP 70/40 and diaphoretic. Pt smoked marijuana 10 minutes before episode.

## 2017-06-01 NOTE — H&P
PRIMARY CARE PHYSICIAN:  None stated.    CHIEF COMPLAINT:  Syncope.    HISTORY OF PRESENT ILLNESS:  He is a 50-year-old male who came to the ER with   above.  Per the patient, while he was working in his house, he suddenly felt   dizzy and passed out.  He does not remember how long he passed out.  He denied   any tongue biting or any urinary or bowel incontinence.  He never had similar   symptoms in the past.  Apart from that, he denied any chest pain, palpitation   or any neurological deficit.    REVIEW OF SYSTEMS:  All other systems were reviewed and negative.  The rest   per HPI.    ALLERGY HISTORY:  No known drug allergy.    PAST MEDICAL HISTORY:  No pertinent past medical history.    SOCIAL HISTORY:  The patient smokes 1 pack of cigarettes a day, drinks a few   shots of vodka a day and smokes marijuana regularly.  The patient has a   history of methamphetamine and cocaine abuse in the past, but the patient says   he has been clean from that for 4 months.    FAMILY HISTORY:  No pertinent family history.    PAST SURGICAL HISTORY:  Hernia surgery repair.    OUTPATIENT MEDICATIONS:  Norco and ibuprofen.    PHYSICAL EXAMINATION:  VITAL SIGNS:  Temperature 97.9, pulse rate 73, respiratory rate 18, blood   pressure 132/77, satting 97% on room air.  GENERAL:  Alert, communicative.  HEENT:  Normocephalic, atraumatic.  NECK:  Supple.  No neck gland enlargement.  CARDIOVASCULAR:  Normal first and second sound.  No murmur.  RESPIRATORY:  Normal respiratory effort.  No wheezing.  ABDOMEN:  Soft, bowel sounds present, nontender.  CENTRAL NERVOUS SYSTEM:  Cranial nerves II-XII intact.  No neurological   deficit.  EXTREMITIES:  No edema, clubbing, or cyanosis.  PSYCHIATRIC:  Normal affect and mood, alert and oriented x4.  SKIN:  Warm and moist.    LABORATORY DATA:  CBC within normal limits.  Chem panel, BUN 13, creatinine   1.28.  Diagnostic alcohol level 0.01.  Troponin negative.  BNP negative.    IMAGING STUDIES:  CT scan of  the head currently pending.  Chest x-ray, no   acute issue identified.    ASSESSMENT AND PLAN:  1.  Syncope, likely related to hypotension related to dehydration.  The   patient's initial blood pressure was 70/40, but he responded well to IV   fluids.  Also, we will monitor him on tele for possible arrhythmia related   syncope as well as I will get an echocardiogram to rule out any structural   heart disease causing the syncope as well.  2.  Acute kidney injury, likely related to prerenal, on IV fluid.  Follow CMP   in the morning.  Avoid nephrotoxin medication.  3.  Anion gap metabolic acidosis.  4.  History of nicotine dependence.  I spent more than 10 minutes on smoking   cessation education.  5.  History of marijuana use currently and history of cocaine and   methamphetamine use, last use was 4 months ago.  6.  Deep venous thrombosis prophylaxis, on heparin.  7.  The patient is a full code.       ____________________________________     MD RONEL GREGORIO / DENZEL    DD:  06/01/2017 13:25:21  DT:  06/01/2017 14:40:24    D#:  [id number]  Job#:  [id number]

## 2017-06-01 NOTE — ED PROVIDER NOTES
ED Provider Note    Scribed for Sarbjit Genao M.D. by Hector Tomas. 6/1/2017, 10:42 AM.    Primary care provider: Pcp Pt States None  Means of arrival: EMS  History obtained from: patient   History limited by: none    CHIEF COMPLAINT  Chief Complaint   Patient presents with   • Syncope       HPI  Ulises Matthews is a 51 y.o. male who presents to the Emergency Department with lightheadedness status post syncopal episode just prior to arrival. Patient has associated right knee swelling and pain. He reports using marijuana today, but states that it does not normally affect him in this way. Patient states that he feels improved, but not normal. He is homeless. Patient denies chest pain, nausea, leg swelling.     REVIEW OF SYSTEMS  Pertinent positives include lightheadedness, knee swelling, knee pain. Pertinent negatives include no chest pain, nausea, leg swelling. As above, all other systems reviewed and are negative.   See HPI for further details.   C.    PAST MEDICAL HISTORY   has a past medical history of Psychiatric disorder.    SURGICAL HISTORY   has past surgical history that includes other abdominal surgery.    SOCIAL HISTORY  Social History   Substance Use Topics   • Smoking status: Current Every Day Smoker -- 0.50 packs/day     Types: Cigarettes   • Smokeless tobacco: None   • Alcohol Use: Yes      History   Drug Use   • Yes     Comment: cocaine, marijuana x3/wk       FAMILY HISTORY  History reviewed. No pertinent family history.    CURRENT MEDICATIONS  Home Medications     Reviewed by Danny Lara (Pharmacy Tech) on 06/01/17 at 1140  Med List Status: Complete    Medication Last Dose Status    Hydrocodone-Acetaminophen (NORCO PO) 5/30/2017 Active    ibuprofen (MOTRIN) 200 MG Tab <week Active                ALLERGIES  No Known Allergies    PHYSICAL EXAM  VITAL SIGNS: /67 mmHg  Pulse 70  Temp(Src) 36.6 °C (97.9 °F)  Resp 12  Ht 1.829 m (6')  Wt 102.059 kg (225 lb)  BMI 30.51  kg/m2  Vitals reviewed.  Constitutional: Alert in no apparent distress.  HENT: No signs of trauma, Bilateral external ears normal, Nose normal. Dry mucous membranes.   Eyes: Pupils are equal and reactive, Conjunctiva normal, Non-icteric.   Neck: Normal range of motion, No tenderness, Supple, No stridor.   Lymphatic: No lymphadenopathy noted.   Cardiovascular: Regular rate and rhythm, no murmurs.   Thorax & Lungs: Normal breath sounds, No respiratory distress, No wheezing, No chest tenderness.   Abdomen: Bowel sounds normal, Soft, No tenderness, No peritoneal signs, No masses, No pulsatile masses.   Skin: Warm, Dry, No erythema, No rash. Abrasions of bilateral knees.   Back: No bony tenderness, No CVA tenderness.   Extremities: Intact distal pulses, No edema, No cyanosis. Abrasions of bilateral knees. No evidence of fracture.   Musculoskeletal: Good range of motion in all major joints. No tenderness to palpation or major deformities noted.   Neurologic: Alert , Normal motor function, Normal sensory function, No focal deficits noted.   Psychiatric: Affect normal, Judgment normal, Mood normal.     DIAGNOSTIC STUDIES / PROCEDURES    LABS  Labs Reviewed   COMP METABOLIC PANEL - Abnormal; Notable for the following:     Anion Gap 14.0 (*)     Glucose 133 (*)     All other components within normal limits    Narrative:     Indicate which anticoagulants the patient is on:->UNKNOWN   APTT - Abnormal; Notable for the following:     APTT 23.2 (*)     All other components within normal limits    Narrative:     Indicate which anticoagulants the patient is on:->UNKNOWN   URINE DRUG SCREEN - Abnormal; Notable for the following:     Cannabinoid Metab Positive (*)     All other components within normal limits   DIAGNOSTIC ALCOHOL - Abnormal; Notable for the following:     Diagnostic Alcohol 0.01 (*)     All other components within normal limits    Narrative:     Indicate which anticoagulants the patient is on:->UNKNOWN   ESTIMATED GFR  - Abnormal; Notable for the following:     GFR If Non  59 (*)     All other components within normal limits    Narrative:     Indicate which anticoagulants the patient is on:->UNKNOWN   CBC WITH DIFFERENTIAL    Narrative:     Indicate which anticoagulants the patient is on:->UNKNOWN   BTYPE NATRIURETIC PEPTIDE    Narrative:     Indicate which anticoagulants the patient is on:->UNKNOWN   PROTHROMBIN TIME    Narrative:     Indicate which anticoagulants the patient is on:->UNKNOWN   TROPONIN    Narrative:     Indicate which anticoagulants the patient is on:->UNKNOWN   All labs reviewed by me.    EKG Interpretation:  Interpreted by me    12 Lead EKG interpreted by me to show:  Normal sinus rhythm  Rate 73  Axis: Normal  Intervals: Normal  Normal T waves  ST elevation in lead V3  Left ventricular hypertrophy  My impression of this EKG: When compared 2/517 non specific ST T wave changes. Cannot rule out ischemia does not meet STEMI criteria at this time.     RADIOLOGY  CT-HEAD W/O   Final Result      No acute intracranial abnormality is identified.      Paranasal sinus disease.      DX-CHEST-PORTABLE (1 VIEW)   Final Result      No consolidation.      The radiologist's interpretation of all radiological studies have been reviewed by me.    COURSE & MEDICAL DECISION MAKING  Nursing notes, VS, PMSFHx reviewed in chart.  Differential diagnoses include but not limited to: vasovagal syncope, cardiac arrhythmia, dehydration     10:42 AM Patient seen and examined at bedside. Ordered for DX chest, Diagnostic alcohol, Urine drug screen, CBC with differential, CMP, BNP, PT/INR, APTT, Troponin to evaluate. Patient will be treated with NS 1000 ml for clinical presentation of dehydration for his symptoms.      12:42 PM - Paged hospitalist    12:54 PM - I discussed the patient's case and the above findings with Dr. Goodwin (hospitalist) who agrees to admit the patient.     DISPOSITION:  Patient will be admitted to  hospitalist in guarded condition.      FINAL IMPRESSION  1. Syncope, unspecified syncope type    2. Hypotension, unspecified hypotension type    3. Abnormal EKG          IHector (Scribe), am scribing for, and in the presence of, Sarbjit Genao M.D..    Electronically signed by: Hector Tomas (Scribe), 6/1/2017    ISarbjit M.D. personally performed the services described in this documentation, as scribed by Hector Tomas in my presence, and it is both accurate and complete.    The note accurately reflects work and decisions made by me.  Sarbjit eGnao  6/1/2017  1:58 PM

## 2017-06-02 VITALS
WEIGHT: 200.4 LBS | DIASTOLIC BLOOD PRESSURE: 88 MMHG | BODY MASS INDEX: 27.14 KG/M2 | RESPIRATION RATE: 18 BRPM | HEIGHT: 72 IN | OXYGEN SATURATION: 97 % | SYSTOLIC BLOOD PRESSURE: 128 MMHG | HEART RATE: 68 BPM | TEMPERATURE: 98.3 F

## 2017-06-02 PROBLEM — I95.9 HYPOTENSION: Status: RESOLVED | Noted: 2017-06-02 | Resolved: 2017-06-02

## 2017-06-02 PROBLEM — R55 SYNCOPE: Status: RESOLVED | Noted: 2017-06-01 | Resolved: 2017-06-02

## 2017-06-02 PROBLEM — E86.0 DEHYDRATION: Status: RESOLVED | Noted: 2017-06-02 | Resolved: 2017-06-02

## 2017-06-02 PROBLEM — E86.0 DEHYDRATION: Status: ACTIVE | Noted: 2017-06-02

## 2017-06-02 PROBLEM — I95.9 HYPOTENSION: Status: ACTIVE | Noted: 2017-06-02

## 2017-06-02 PROBLEM — F10.10 ALCOHOL ABUSE: Status: ACTIVE | Noted: 2017-06-02

## 2017-06-02 LAB
ALBUMIN SERPL BCP-MCNC: 3.6 G/DL (ref 3.2–4.9)
ALBUMIN/GLOB SERPL: 1.4 G/DL
ALP SERPL-CCNC: 71 U/L (ref 30–99)
ALT SERPL-CCNC: 19 U/L (ref 2–50)
ANION GAP SERPL CALC-SCNC: 5 MMOL/L (ref 0–11.9)
AST SERPL-CCNC: 26 U/L (ref 12–45)
BILIRUB SERPL-MCNC: 0.4 MG/DL (ref 0.1–1.5)
BUN SERPL-MCNC: 15 MG/DL (ref 8–22)
CALCIUM SERPL-MCNC: 8.4 MG/DL (ref 8.5–10.5)
CHLORIDE SERPL-SCNC: 107 MMOL/L (ref 96–112)
CO2 SERPL-SCNC: 26 MMOL/L (ref 20–33)
CREAT SERPL-MCNC: 0.95 MG/DL (ref 0.5–1.4)
ERYTHROCYTE [DISTWIDTH] IN BLOOD BY AUTOMATED COUNT: 45.1 FL (ref 35.9–50)
GFR SERPL CREATININE-BSD FRML MDRD: >60 ML/MIN/1.73 M 2
GLOBULIN SER CALC-MCNC: 2.5 G/DL (ref 1.9–3.5)
GLUCOSE SERPL-MCNC: 141 MG/DL (ref 65–99)
HCT VFR BLD AUTO: 38.5 % (ref 42–52)
HGB BLD-MCNC: 13 G/DL (ref 14–18)
LV EJECT FRACT  99904: 70
LV EJECT FRACT MOD 2C 99903: 68.33
LV EJECT FRACT MOD 4C 99902: 69.47
LV EJECT FRACT MOD BP 99901: 68.82
MCH RBC QN AUTO: 31.8 PG (ref 27–33)
MCHC RBC AUTO-ENTMCNC: 33.8 G/DL (ref 33.7–35.3)
MCV RBC AUTO: 94.1 FL (ref 81.4–97.8)
PLATELET # BLD AUTO: 228 K/UL (ref 164–446)
PMV BLD AUTO: 9.9 FL (ref 9–12.9)
POTASSIUM SERPL-SCNC: 3.6 MMOL/L (ref 3.6–5.5)
PROT SERPL-MCNC: 6.1 G/DL (ref 6–8.2)
RBC # BLD AUTO: 4.09 M/UL (ref 4.7–6.1)
SODIUM SERPL-SCNC: 138 MMOL/L (ref 135–145)
WBC # BLD AUTO: 7.8 K/UL (ref 4.8–10.8)

## 2017-06-02 PROCEDURE — 36415 COLL VENOUS BLD VENIPUNCTURE: CPT

## 2017-06-02 PROCEDURE — G0378 HOSPITAL OBSERVATION PER HR: HCPCS

## 2017-06-02 PROCEDURE — 96361 HYDRATE IV INFUSION ADD-ON: CPT

## 2017-06-02 PROCEDURE — 85027 COMPLETE CBC AUTOMATED: CPT

## 2017-06-02 PROCEDURE — 80053 COMPREHEN METABOLIC PANEL: CPT

## 2017-06-02 PROCEDURE — 99217 PR OBSERVATION CARE DISCHARGE: CPT | Performed by: HOSPITALIST

## 2017-06-02 PROCEDURE — 93306 TTE W/DOPPLER COMPLETE: CPT

## 2017-06-02 PROCEDURE — 93306 TTE W/DOPPLER COMPLETE: CPT | Mod: 26 | Performed by: INTERNAL MEDICINE

## 2017-06-02 ASSESSMENT — LIFESTYLE VARIABLES
HEADACHE, FULLNESS IN HEAD: NOT PRESENT
AGITATION: NORMAL ACTIVITY
ORIENTATION AND CLOUDING OF SENSORIUM: ORIENTED AND CAN DO SERIAL ADDITIONS
PAROXYSMAL SWEATS: NO SWEAT VISIBLE
ORIENTATION AND CLOUDING OF SENSORIUM: ORIENTED AND CAN DO SERIAL ADDITIONS
TREMOR: NO TREMOR
ORIENTATION AND CLOUDING OF SENSORIUM: ORIENTED AND CAN DO SERIAL ADDITIONS
TOTAL SCORE: 0
VISUAL DISTURBANCES: NOT PRESENT
ANXIETY: NO ANXIETY (AT EASE)
TREMOR: NO TREMOR
NAUSEA AND VOMITING: NO NAUSEA AND NO VOMITING
PAROXYSMAL SWEATS: BARELY PERCEPTIBLE SWEATING. PALMS MOIST
TREMOR: NO TREMOR
HEADACHE, FULLNESS IN HEAD: NOT PRESENT
AUDITORY DISTURBANCES: NOT PRESENT
AUDITORY DISTURBANCES: NOT PRESENT
EVER_SMOKED: YES
AGITATION: NORMAL ACTIVITY
TOTAL SCORE: 0
VISUAL DISTURBANCES: NOT PRESENT
ANXIETY: NO ANXIETY (AT EASE)
AGITATION: NORMAL ACTIVITY
NAUSEA AND VOMITING: NO NAUSEA AND NO VOMITING
TOTAL SCORE: 1
PAROXYSMAL SWEATS: NO SWEAT VISIBLE
AUDITORY DISTURBANCES: NOT PRESENT
ANXIETY: NO ANXIETY (AT EASE)
HEADACHE, FULLNESS IN HEAD: NOT PRESENT
VISUAL DISTURBANCES: NOT PRESENT
NAUSEA AND VOMITING: NO NAUSEA AND NO VOMITING

## 2017-06-02 ASSESSMENT — PAIN SCALES - GENERAL
PAINLEVEL_OUTOF10: 0

## 2017-06-02 ASSESSMENT — PAIN SCALES - WONG BAKER
WONGBAKER_NUMERICALRESPONSE: DOESN'T HURT AT ALL
WONGBAKER_NUMERICALRESPONSE: DOESN'T HURT AT ALL

## 2017-06-02 NOTE — PROGRESS NOTES
Assumed care of pt. Pt A&Ox4. Pt denies any pain and any additional needs at this time. Pt educated on fall risk and shows understanding. Call light and personal belongings within reach. Will continue to monitor.

## 2017-06-02 NOTE — PROGRESS NOTES
Resting in bed, denies  pain or sob. sr - no ectopy. Needs attended. Call light within reach. To continue to monitor.

## 2017-06-02 NOTE — PROGRESS NOTES
Discharge instructions, medications and follow-up reviewed with pt, pt verbalized understanding and denies questions. Discharge paperwork given to pt. PIV removed, TeleBox removed, armband removed. Pt walk off unit with hospital escort.

## 2017-06-02 NOTE — DISCHARGE INSTRUCTIONS
Discharge Instructions    Discharged to home by car with relative. Discharged via wheelchair, hospital escort: Yes.  Special equipment needed: Not Applicable    Be sure to schedule a follow-up appointment with your primary care doctor or any specialists as instructed.     Discharge Plan:   Diet Plan: Discussed  Activity Level: Discussed  Smoking Cessation Offered: Patient Counseled  Confirmed Follow up Appointment: Patient to Call and Schedule Appointment  Confirmed Symptoms Management: Discussed  Medication Reconciliation Updated: Yes  Influenza Vaccine Indication: Indicated: Not available from distributor/    I understand that a diet low in cholesterol, fat, and sodium is recommended for good health. Unless I have been given specific instructions below for another diet, I accept this instruction as my diet prescription.   Other diet: Regular    Special Instructions: None    · Is patient discharged on Warfarin / Coumadin?   No     · Is patient Post Blood Transfusion?  No    Depression / Suicide Risk    As you are discharged from this [facility] facility, it is important to learn how to keep safe from harming yourself.    Recognize the warning signs:  · Abrupt changes in personality, positive or negative- including increase in energy   · Giving away possessions  · Change in eating patterns- significant weight changes-  positive or negative  · Change in sleeping patterns- unable to sleep or sleeping all the time   · Unwillingness or inability to communicate  · Depression  · Unusual sadness, discouragement and loneliness  · Talk of wanting to die  · Neglect of personal appearance   · Rebelliousness- reckless behavior  · Withdrawal from people/activities they love  · Confusion- inability to concentrate     If you or a loved one observes any of these behaviors or has concerns about self-harm, here's what you can do:  · Talk about it- your feelings and reasons for harming yourself  · Remove any means that  you might use to hurt yourself (examples: pills, rope, extension cords, firearm)  · Get professional help from the community (Mental Health, Substance Abuse, psychological counseling)  · Do not be alone:Call your Safe Contact- someone whom you trust who will be there for you.  · Call your local CRISIS HOTLINE 939-1040 or 710-880-2970  · Call your local Children's Mobile Crisis Response Team [place] (398) 337-7629 or www.PhysicianPortal  · Call the toll free National Suicide Prevention Hotlines   · National Suicide Prevention Lifeline 154-121-NLQU (1121)  · National Hope Line Network 800-SUICIDE (954-8246)

## 2017-06-03 NOTE — DISCHARGE SUMMARY
CODE STATUS:  Full code.    PRIMARY CARE PROVIDER:  The patient is seen at the [facility].    DISCHARGE DIAGNOSES:  1.  Syncopal event.  2.  Alcohol abuse.    CHRONIC MEDICAL PROBLEMS:  1.  Nicotine use.  2.  Alcohol abuse.    STUDIES:  Hematology:  WBC 7.8, hemoglobin and hematocrit 13.0 and 38.5,   platelets 228.  Chemistry panel:  Sodium 138, potassium 3.6, otherwise   unremarkable chemistry panel, alcohol 0.01.  Urine drug screen was positive   for cannabis, otherwise negative, troponins negative at 0.01.  BNP less than   2.  PT 13.5, INR 1.0.    IMAGING STUDIES:  1.  Chest x-ray indicates no evidence of focal consolidation or evidence of   pulmonary edema.  No evidence of pleural effusion and heart is normal in size.  2.  CT head without contrast indicated no acute intracranial abnormality   identified.  Paranasal sinus disease.  3.  Echocardiogram indicated normal left ventricular chamber size.  Left   ventricular ejection fraction visually estimated 70%.  Grade I diastolic   dysfunction with trace tricuspid regurgitation.  Normal pericardium without   effusion.  4.  EKG indicates sinus rhythm with a rate of 73.    CONSULTS:  None.    PROCEDURES:  None.    HOSPITAL COURSE:  H and P on admission done by Dr. Goodwin, please see his full H   and P for further details.  Briefly, this is a 50-year-old gentleman, who   presented to the emergency department after a syncopal event while at home.    The patient comes in after a syncopal event.  [place] and he reports feeling warm was trying to move to the shade and   had a syncopal event and 911 was called.  The patient was brought to the   emergency department in stable condition.  There was an episode of hypotension   70s/40s.  Patient also reports that he smoked marijuana and had something to   drink immediately prior to event.  Patient was admitted to the clinical   decision unit in stable condition.  We did obtain an  echocardiogram.  CAT scan   of his head, a chest x-ray and an EKG.  Patient was also monitored on the   telemetry monitoring and no ectopy or abnormalities were found.    On day of discharge, the patient is sitting up in bed, reporting that he feels   well.  He has been ambulating around the unit with this smooth study gait and   reports that he feels better and looking forward to going home.  The patient   did admit to drinking of Gatorade with alcohol and smoking marijuana yesterday   prior to syncopal event.  We discussed the importance of good hydration and   patient was given examples of how much water he should be drinking, especially   given his alcohol use and other drug use.  The patient states understanding.    He reiterated the information and reports that he will be better off his   hydration, given the upcoming warmer months.  Lastly, the patient has been   advised to follow up with his primary care provider, which he seen previously   at the [facility] and he states he will do this.  Patient's vital   signs remained stable.  His blood work is unremarkable.  He is eating and   drinking without any difficulty ambulating around the unit with a smooth   steady gait and will be discharged home in stable condition.    DISPOSITION:  To home.    CONDITION:  He is stable.    ACTIVITY:  As tolerated.    DIET:  Continue a healthy diet.  The patient is advised to increase his   overall fluid intake, specifically water.    DISCHARGE MEDICATIONS:  Ibuprofen 200 mg tablets, take 2 by mouth every 6   hours as needed for pain.  The patient is advised to stop taking Norco.    FOLLOWUP:  The patient is advised to follow up with his primary care provider   at the [facility].    INSTRUCTIONS:  I advised to return to the emergency department with any   worsening or concerning symptoms.  The patient is also advised to decrease his   alcohol use as this is likely contributing to his dehydration and  possible   syncopal event.    Time spent on discharge, 39 minutes.       ____________________________________     STORMY Sabillon / DENZEL    DD:  06/02/2017 15:32:59  DT:  06/02/2017 22:47:29    D#:  [id number]  Job#:  [id number]

## 2017-07-12 DIAGNOSIS — Z01.812 PRE-PROCEDURAL LABORATORY EXAMINATION: ICD-10-CM

## 2017-07-12 LAB
AMPHET UR QL SCN: NEGATIVE
ANION GAP SERPL CALC-SCNC: 10 MMOL/L (ref 0–11.9)
BARBITURATES UR QL SCN: NEGATIVE
BASOPHILS # BLD AUTO: 0.7 % (ref 0–1.8)
BASOPHILS # BLD: 0.06 K/UL (ref 0–0.12)
BENZODIAZ UR QL SCN: NEGATIVE
BUN SERPL-MCNC: 15 MG/DL (ref 8–22)
BZE UR QL SCN: NEGATIVE
CALCIUM SERPL-MCNC: 9.7 MG/DL (ref 8.5–10.5)
CANNABINOIDS UR QL SCN: NEGATIVE
CHLORIDE SERPL-SCNC: 103 MMOL/L (ref 96–112)
CO2 SERPL-SCNC: 25 MMOL/L (ref 20–33)
CREAT SERPL-MCNC: 0.93 MG/DL (ref 0.5–1.4)
EOSINOPHIL # BLD AUTO: 0.46 K/UL (ref 0–0.51)
EOSINOPHIL NFR BLD: 5.5 % (ref 0–6.9)
ERYTHROCYTE [DISTWIDTH] IN BLOOD BY AUTOMATED COUNT: 44.5 FL (ref 35.9–50)
GFR SERPL CREATININE-BSD FRML MDRD: >60 ML/MIN/1.73 M 2
GLUCOSE SERPL-MCNC: 86 MG/DL (ref 65–99)
HCT VFR BLD AUTO: 42.1 % (ref 42–52)
HGB BLD-MCNC: 14.1 G/DL (ref 14–18)
IMM GRANULOCYTES # BLD AUTO: 0.01 K/UL (ref 0–0.11)
IMM GRANULOCYTES NFR BLD AUTO: 0.1 % (ref 0–0.9)
LYMPHOCYTES # BLD AUTO: 2.07 K/UL (ref 1–4.8)
LYMPHOCYTES NFR BLD: 24.6 % (ref 22–41)
MCH RBC QN AUTO: 31.2 PG (ref 27–33)
MCHC RBC AUTO-ENTMCNC: 33.5 G/DL (ref 33.7–35.3)
MCV RBC AUTO: 93.1 FL (ref 81.4–97.8)
METHADONE UR QL SCN: NEGATIVE
MONOCYTES # BLD AUTO: 0.78 K/UL (ref 0–0.85)
MONOCYTES NFR BLD AUTO: 9.3 % (ref 0–13.4)
NEUTROPHILS # BLD AUTO: 5.02 K/UL (ref 1.82–7.42)
NEUTROPHILS NFR BLD: 59.8 % (ref 44–72)
NRBC # BLD AUTO: 0 K/UL
NRBC BLD AUTO-RTO: 0 /100 WBC
OPIATES UR QL SCN: NEGATIVE
OXYCODONE UR QL SCN: NEGATIVE
PCP UR QL SCN: NEGATIVE
PLATELET # BLD AUTO: 254 K/UL (ref 164–446)
PMV BLD AUTO: 10.5 FL (ref 9–12.9)
POTASSIUM SERPL-SCNC: 4.1 MMOL/L (ref 3.6–5.5)
PROPOXYPH UR QL SCN: NEGATIVE
RBC # BLD AUTO: 4.52 M/UL (ref 4.7–6.1)
SODIUM SERPL-SCNC: 138 MMOL/L (ref 135–145)
WBC # BLD AUTO: 8.4 K/UL (ref 4.8–10.8)

## 2017-07-12 PROCEDURE — 80307 DRUG TEST PRSMV CHEM ANLYZR: CPT

## 2017-07-12 PROCEDURE — 85025 COMPLETE CBC W/AUTO DIFF WBC: CPT

## 2017-07-12 PROCEDURE — 80048 BASIC METABOLIC PNL TOTAL CA: CPT

## 2017-07-12 PROCEDURE — 36415 COLL VENOUS BLD VENIPUNCTURE: CPT

## 2017-07-13 ENCOUNTER — HOSPITAL ENCOUNTER (OUTPATIENT)
Facility: MEDICAL CENTER | Age: 52
End: 2017-07-14
Attending: SURGERY | Admitting: SURGERY
Payer: MEDICAID

## 2017-07-13 PROBLEM — K40.91 UNILATERAL RECURRENT INGUINAL HERNIA: Status: ACTIVE | Noted: 2017-07-13

## 2017-07-13 PROBLEM — K40.90 INGUINAL HERNIA UNILATERAL, NON-RECURRENT: Status: ACTIVE | Noted: 2017-07-13

## 2017-07-13 LAB
AMPHET UR QL SCN: NEGATIVE
BARBITURATES UR QL SCN: NEGATIVE
BENZODIAZ UR QL SCN: NEGATIVE
BZE UR QL SCN: NEGATIVE
CANNABINOIDS UR QL SCN: NEGATIVE
METHADONE UR QL SCN: NEGATIVE
OPIATES UR QL SCN: NEGATIVE
OXYCODONE UR QL SCN: NEGATIVE
PCP UR QL SCN: NEGATIVE
PROPOXYPH UR QL SCN: NEGATIVE

## 2017-07-13 PROCEDURE — 160035 HCHG PACU - 1ST 60 MINS PHASE I: Performed by: SURGERY

## 2017-07-13 PROCEDURE — 500868 HCHG NEEDLE, SURGI(VARES): Performed by: SURGERY

## 2017-07-13 PROCEDURE — 160041 HCHG SURGERY MINUTES - EA ADDL 1 MIN LEVEL 4: Performed by: SURGERY

## 2017-07-13 PROCEDURE — 700101 HCHG RX REV CODE 250

## 2017-07-13 PROCEDURE — 700102 HCHG RX REV CODE 250 W/ 637 OVERRIDE(OP)

## 2017-07-13 PROCEDURE — 160002 HCHG RECOVERY MINUTES (STAT): Performed by: SURGERY

## 2017-07-13 PROCEDURE — A9270 NON-COVERED ITEM OR SERVICE: HCPCS

## 2017-07-13 PROCEDURE — 501838 HCHG SUTURE GENERAL: Performed by: SURGERY

## 2017-07-13 PROCEDURE — 700111 HCHG RX REV CODE 636 W/ 250 OVERRIDE (IP)

## 2017-07-13 PROCEDURE — 160029 HCHG SURGERY MINUTES - 1ST 30 MINS LEVEL 4: Performed by: SURGERY

## 2017-07-13 PROCEDURE — 80307 DRUG TEST PRSMV CHEM ANLYZR: CPT

## 2017-07-13 PROCEDURE — 160036 HCHG PACU - EA ADDL 30 MINS PHASE I: Performed by: SURGERY

## 2017-07-13 PROCEDURE — G0378 HOSPITAL OBSERVATION PER HR: HCPCS

## 2017-07-13 PROCEDURE — 503108 HCHG CANNULA SEAL 8.5-13MM: Performed by: SURGERY

## 2017-07-13 PROCEDURE — 503366 HCHG TROCAR, 12X150 KII FIOS Z THR: Performed by: SURGERY

## 2017-07-13 PROCEDURE — C1781 MESH (IMPLANTABLE): HCPCS | Performed by: SURGERY

## 2017-07-13 PROCEDURE — 160009 HCHG ANES TIME/MIN: Performed by: SURGERY

## 2017-07-13 PROCEDURE — 502714 HCHG ROBOTIC SURGERY SERVICES: Performed by: SURGERY

## 2017-07-13 PROCEDURE — 160048 HCHG OR STATISTICAL LEVEL 1-5: Performed by: SURGERY

## 2017-07-13 PROCEDURE — 502240 HCHG MISC OR SUPPLY RC 0272: Performed by: SURGERY

## 2017-07-13 DEVICE — MESH PROGRIP LAPROSCOPIC SELF FIXATING (1/CA): Type: IMPLANTABLE DEVICE | Status: FUNCTIONAL

## 2017-07-13 RX ORDER — MORPHINE SULFATE 4 MG/ML
3 INJECTION, SOLUTION INTRAMUSCULAR; INTRAVENOUS
Status: DISCONTINUED | OUTPATIENT
Start: 2017-07-13 | End: 2017-07-14 | Stop reason: HOSPADM

## 2017-07-13 RX ORDER — LIDOCAINE HYDROCHLORIDE 10 MG/ML
0.5 INJECTION, SOLUTION INFILTRATION; PERINEURAL
Status: COMPLETED | OUTPATIENT
Start: 2017-07-13 | End: 2017-07-13

## 2017-07-13 RX ORDER — MIDAZOLAM HYDROCHLORIDE 1 MG/ML
INJECTION INTRAMUSCULAR; INTRAVENOUS
Status: DISPENSED
Start: 2017-07-13 | End: 2017-07-14

## 2017-07-13 RX ORDER — LIDOCAINE HYDROCHLORIDE 10 MG/ML
INJECTION, SOLUTION INFILTRATION; PERINEURAL
Status: COMPLETED
Start: 2017-07-13 | End: 2017-07-13

## 2017-07-13 RX ORDER — BUPIVACAINE HYDROCHLORIDE AND EPINEPHRINE 5; 5 MG/ML; UG/ML
INJECTION, SOLUTION EPIDURAL; INTRACAUDAL; PERINEURAL
Status: DISCONTINUED | OUTPATIENT
Start: 2017-07-13 | End: 2017-07-13 | Stop reason: HOSPADM

## 2017-07-13 RX ORDER — SODIUM CHLORIDE, SODIUM LACTATE, POTASSIUM CHLORIDE, CALCIUM CHLORIDE 600; 310; 30; 20 MG/100ML; MG/100ML; MG/100ML; MG/100ML
INJECTION, SOLUTION INTRAVENOUS CONTINUOUS
Status: DISCONTINUED | OUTPATIENT
Start: 2017-07-13 | End: 2017-07-14 | Stop reason: HOSPADM

## 2017-07-13 RX ORDER — ONDANSETRON 2 MG/ML
4 INJECTION INTRAMUSCULAR; INTRAVENOUS EVERY 4 HOURS PRN
Status: DISCONTINUED | OUTPATIENT
Start: 2017-07-13 | End: 2017-07-14 | Stop reason: HOSPADM

## 2017-07-13 RX ORDER — OXYCODONE HYDROCHLORIDE AND ACETAMINOPHEN 5; 325 MG/1; MG/1
1-2 TABLET ORAL EVERY 4 HOURS PRN
Status: DISCONTINUED | OUTPATIENT
Start: 2017-07-13 | End: 2017-07-14 | Stop reason: HOSPADM

## 2017-07-13 RX ORDER — LIDOCAINE AND PRILOCAINE 25; 25 MG/G; MG/G
1 CREAM TOPICAL
Status: COMPLETED | OUTPATIENT
Start: 2017-07-13 | End: 2017-07-13

## 2017-07-13 RX ADMIN — LIDOCAINE HYDROCHLORIDE 0.5 ML: 10 INJECTION, SOLUTION INFILTRATION; PERINEURAL at 13:45

## 2017-07-13 RX ADMIN — SODIUM CHLORIDE, SODIUM LACTATE, POTASSIUM CHLORIDE, CALCIUM CHLORIDE 1000 ML: 600; 310; 30; 20 INJECTION, SOLUTION INTRAVENOUS at 13:45

## 2017-07-13 RX ADMIN — HYDROCODONE BITARTRATE AND ACETAMINOPHEN 30 ML: 2.5; 108 SOLUTION ORAL at 16:28

## 2017-07-13 ASSESSMENT — PAIN SCALES - GENERAL
PAINLEVEL_OUTOF10: 4
PAINLEVEL_OUTOF10: 4
PAINLEVEL_OUTOF10: 0
PAINLEVEL_OUTOF10: 0
PAINLEVEL_OUTOF10: 2
PAINLEVEL_OUTOF10: 0
PAINLEVEL_OUTOF10: 4
PAINLEVEL_OUTOF10: 4

## 2017-07-13 ASSESSMENT — PATIENT HEALTH QUESTIONNAIRE - PHQ9
SUM OF ALL RESPONSES TO PHQ QUESTIONS 1-9: 0
2. FEELING DOWN, DEPRESSED, IRRITABLE, OR HOPELESS: NOT AT ALL
1. LITTLE INTEREST OR PLEASURE IN DOING THINGS: NOT AT ALL
SUM OF ALL RESPONSES TO PHQ9 QUESTIONS 1 AND 2: 0

## 2017-07-13 ASSESSMENT — LIFESTYLE VARIABLES
EVER HAD A DRINK FIRST THING IN THE MORNING TO STEADY YOUR NERVES TO GET RID OF A HANGOVER: NO
TOTAL SCORE: 0
HAVE PEOPLE ANNOYED YOU BY CRITICIZING YOUR DRINKING: NO
EVER_SMOKED: YES
HAVE YOU EVER FELT YOU SHOULD CUT DOWN ON YOUR DRINKING: NO
HOW MANY TIMES IN THE PAST YEAR HAVE YOU HAD 5 OR MORE DRINKS IN A DAY: 2
EVER FELT BAD OR GUILTY ABOUT YOUR DRINKING: NO
TOTAL SCORE: 0
TOTAL SCORE: 0
ALCOHOL_USE: YES
ON A TYPICAL DAY WHEN YOU DRINK ALCOHOL HOW MANY DRINKS DO YOU HAVE: 3
AVERAGE NUMBER OF DAYS PER WEEK YOU HAVE A DRINK CONTAINING ALCOHOL: 3
CONSUMPTION TOTAL: POSITIVE

## 2017-07-13 NOTE — DISCHARGE PLANNING
Patient will be staying overnight Observation. Called and advised the [facility] that he would not be returning until tomorrow probably before noon. Asked if they had received my letter of request to stay in the day room, they stated their fax machine was not receiving. Copy of letter is scanned into Media tab if patient does not have his copy to hand carry to the shelter.

## 2017-07-13 NOTE — IP AVS SNAPSHOT
Home Care Instructions                                                                                                                  Name:Ulises Matthews  Medical Record Number:[id number]  CSN: [id number]    YOB: 1965   Age: 52 y.o.  Sex: male  HT:1.829 m (6') WT: 90.6 kg (199 lb 11.8 oz)          Admit Date: 7/13/2017     Discharge Date:   Today's Date: 7/14/2017  Attending Doctor:  Bernardo Hernandez M.D.                  Allergies:  Review of patient's allergies indicates no known allergies.            Discharge Instructions       Discharge Instructions    Discharged to other by car with self. Discharged via walking, hospital escort: Refused.  Special equipment needed: Not Applicable    Be sure to schedule a follow-up appointment with your primary care doctor or any specialists as instructed.     Discharge Plan:   Diet Plan: Discussed  Activity Level: Discussed  Smoking Cessation Offered: Patient Counseled  Confirmed Follow up Appointment: Patient to Call and Schedule Appointment  Confirmed Symptoms Management: Discussed  Influenza Vaccine Indication: Indicated: Not available from distributor/    I understand that a diet low in cholesterol, fat, and sodium is recommended for good health. Unless I have been given specific instructions below for another diet, I accept this instruction as my diet prescription.   Other diet: Regular    Special Instructions: None    · Is patient discharged on Warfarin / Coumadin?   No     · Is patient Post Blood Transfusion?  No    Depression / Suicide Risk    As you are discharged from this [facility] facility, it is important to learn how to keep safe from harming yourself.    Recognize the warning signs:  · Abrupt changes in personality, positive or negative- including increase in energy   · Giving away possessions  · Change in eating patterns- significant weight changes-  positive or negative  · Change in sleeping patterns- unable to sleep or  sleeping all the time   · Unwillingness or inability to communicate  · Depression  · Unusual sadness, discouragement and loneliness  · Talk of wanting to die  · Neglect of personal appearance   · Rebelliousness- reckless behavior  · Withdrawal from people/activities they love  · Confusion- inability to concentrate     If you or a loved one observes any of these behaviors or has concerns about self-harm, here's what you can do:  · Talk about it- your feelings and reasons for harming yourself  · Remove any means that you might use to hurt yourself (examples: pills, rope, extension cords, firearm)  · Get professional help from the community (Mental Health, Substance Abuse, psychological counseling)  · Do not be alone:Call your Safe Contact- someone whom you trust who will be there for you.  · Call your local CRISIS HOTLINE 767-2708 or 343-782-5952  · Call your local Children's Mobile Crisis Response Team [place] (814) 024-3816 or www.[place]  · Call the toll free National Suicide Prevention Hotlines   · National Suicide Prevention Lifeline 951-022-SLCB (9819)  · National Hope Line Network 800-SUICIDE (484-5814)    Hernia Repair  Care After  These instructions give you information on caring for yourself after your procedure. Your doctor may also give you more specific instructions. Call your doctor if you have any problems or questions after your procedure.  HOME CARE   · You may have changes in your poops (bowel movements).  · You may have loose or watery poop (diarrhea).  · You may be not able to poop.  · Your bowels will slowly get back to normal.  · Do not eat any food that makes you sick to your stomach (nauseous). Eat small meals 4 to 6 times a day instead of 3 large ones.  · Do not drink pop. It will give you gas.  · Do not drink alcohol.  · Do not lift anything heavier than 10 pounds. This is about the weight of a gallon of milk.  · Do not do anything that makes you very tired for at least 6  weeks.  · Do not get your wound wet for 2 days.  · You may take a sponge bath during this time.  · After 2 days you may take a shower. Gently pat your surgical cut (incision) dry with a towel. Do not rub it.  · For men: You may have been given an athletic supporter (scrotal support) before you left the hospital. It holds your scrotum and testicles closer to your body so there is no strain on your wound. Wear the supporter until your doctor tells you that you do not need it anymore.  GET HELP RIGHT AWAY IF:  · You have watery poop, or cannot poop for more than 3 days.  · You feel sick to your stomach or throw up (vomit) more than 2 or 3 times.  · You have temperature by mouth above 102° F (38.9° C).  · You see redness or puffiness (swelling) around your wound.  · You see yellowish white fluid (pus) coming from your wound.  · You see a bulge or bump in your lower belly (abdomen) or near your groin.  · You develop a rash, trouble breathing, or any other symptoms from medicines taken.  MAKE SURE YOU:  · Understand these instructions.  · Will watch your condition.  · Will get help right away if your are not doing well or get worse.  Document Released: 11/30/2009 Document Revised: 03/11/2013 Document Reviewed: 11/30/2009  ExitCare® Patient Information ©2014 [employer].         Discharge Medication Instructions:    Below are the medications your physician expects you to take upon discharge:    Review all your home medications and newly ordered medications with your doctor and/or pharmacist. Follow medication instructions as directed by your doctor and/or pharmacist.    Please keep your medication list with you and share with your physician.               Medication List      CONTINUE taking these medications        Instructions    Morning Afternoon Evening Bedtime    JOHN SELTZER PLUS PO        Take 1 Dose by mouth every 6 hours as needed.   Dose:  1 Dose                        ibuprofen 200 MG Tabs   Commonly known as:   MOTRIN        Take 400 mg by mouth every 6 hours as needed.   Dose:  400 mg                                Instructions           Diet / Nutrition:    Follow any diet instructions given to you by your doctor or the dietician, including how much salt (sodium) you are allowed each day.    If you are overweight, talk to your doctor about a weight reduction plan.    Activity:    Remain physically active following your doctor's instructions about exercise and activity.    Rest often.     Any time you become even a little tired or short of breath, SIT DOWN and rest.    Worsening Symptoms:    Report any of the following signs and symptoms to the doctor's office immediately:    *Pain of jaw, arm, or neck  *Chest pain not relieved by medication                               *Dizziness or loss of consciousness  *Difficulty breathing even when at rest   *More tired than usual                                       *Bleeding drainage or swelling of surgical site  *Swelling of feet, ankles, legs or stomach                 *Fever (>100ºF)  *Pink or blood tinged sputum  *Weight gain (3lbs/day or 5lbs /week)           *Shock from internal defibrillator (if applicable)  *Palpitations or irregular heartbeats                *Cool and/or numb extremities    Stroke Awareness    Common Risk Factors for Stroke include:    Age  Atrial Fibrillation  Carotid Artery Stenosis  Diabetes Mellitus  Excessive alcohol consumption  High blood pressure  Overweight   Physical inactivity  Smoking    Warning signs and symptoms of a stroke include:    *Sudden numbness or weakness of the face, arm or leg (especially on one side of the body).  *Sudden confusion, trouble speaking or understanding.  *Sudden trouble seeing in one or both eyes.  *Sudden trouble walking, dizziness, loss of balance or coordination.Sudden severe headache with no known cause.    It is very important to get treatment quickly when a stroke occurs. If you experience any of the above  warning signs, call 561 immediately.                   Disclaimer         Quit Smoking / Tobacco Use:    I understand the use of any tobacco products increases my chance of suffering from future heart disease or stroke and could cause other illnesses which may shorten my life. Quitting the use of tobacco products is the single most important thing I can do to improve my health. For further information on smoking / tobacco cessation call a Toll Free Quit Line at 1-693.430.4582 (*National Cancer [place]) or 1-665.423.6583 (American Lung Association) or you can access the web based program at www.lungusa.org.    [place] Tobacco Users Help Line:  (877) 245-6089       Toll Free: 1-945.549.3314  Quit Tobacco Program formerly [facility] (310)594-4254    Crisis Hotline:    [place] Crisis Hotline:  6-925-LEHXWFF or 1-142.625.1647    [place] Crisis Hotline:    1-324.718.4796 or 970-644-5055    Discharge Survey:   Thank you for choosing [facility]. We hope we did everything we could to make your hospital stay a pleasant one. You may be receiving a phone survey and we would appreciate your time and participation in answering the questions. Your input is very valuable to us in our efforts to improve our service to our patients and their families.        My signature on this form indicates that:    1. I have reviewed and understand the above information.  2. My questions regarding this information have been answered to my satisfaction.  3. I have formulated a plan with my discharge nurse to obtain my prescribed medications for home.                  Disclaimer         __________________________________                     __________       ________                       Patient Signature                                                 Date                    Time

## 2017-07-13 NOTE — OR SURGEON
Operative Report    PreOp Diagnosis: Left inguinal hernia    PostOp Diagnosis: Left inguinal hernia    Procedure(s):  Robotic assisted, laparoscopic left inguinal hernia repair with mesh - Wound Class: Clean    Surgeon(s):  NORMA Car M.D.    Anesthesiologist/Type of Anesthesia:  Anesthesiologist: Haven Peña M.D./General    Surgical Staff:  Circulator: Aric Solitario R.N.  Scrub Person: Gia Maya    Specimens:None    Estimated Blood Loss: 10 cc    Findings: Extensive scar tissue in the left inguinal region.  Large indirect left inguinal hernia.  No right inguinal hernia    Complications: None        7/13/2017 4:16 PM Bernardo Hernandez

## 2017-07-13 NOTE — DISCHARGE PLANNING
Late entry for 7/12/17 at 1000;  Patient was in for their pre-admission appointment. He will have a inguinal hernia repair on 7/13/17. He lives at the [facility] and has no transportation home following the OP procedure. Ins- [employer]. I advised him that I would be able to arrange transport via [facility] transport benefits. This CM faxed a letter to the [facility] advising them that he would be returning late to shelter due to the timing of the surgery and requesting that he be allowed to stay in the day room during the day from 7/14/17-7/19/17. Provided patient a copy of that letter as well.

## 2017-07-13 NOTE — IP AVS SNAPSHOT
7/14/2017    [facility]  [address] [place] [address]    Dear Ulises:    [facility] wants to ensure your discharge home is safe and you or your loved ones have had all of your questions answered regarding your care after you leave the hospital.    Below is a list of resources and contact information should you have any questions regarding your hospital stay, follow-up instructions, or active medical symptoms.    Questions or Concerns Regarding… Contact   Medical Questions Related to Your Discharge  (7 days a week, 8am-5pm) Contact a Nurse Care Coordinator   [phone]   Medical Questions Not Related to Your Discharge  (24 hours a day / 7 days a week)  Contact the Nurse Health Line   [phone]    Medications or Discharge Instructions Refer to your discharge packet   or contact your [facility] Primary Care Provider   [phone]   Follow-up Appointment(s) Schedule your appointment via [employer]   or contact Scheduling [phone]   Billing Review your statement via [employer]  or contact Billing [phone]   Medical Records Review your records via [employer]   or contact Medical Records [phone]     You may receive a telephone call within two days of discharge. This call is to make certain you understand your discharge instructions and have the opportunity to have any questions answered. You can also easily access your medical information, test results and upcoming appointments via the [employer] free online health management tool. You can learn more and sign up at [employer]/ImmunGene. For assistance setting up your [employer] account, please call [phone].    Once again, we want to ensure your discharge home is safe and that you have a clear understanding of any next steps in your care. If you have any questions or concerns, please do not hesitate to contact us, we are here for you. Thank you for choosing [facility] for your healthcare needs.    Sincerely,    Your [facility] Healthcare Team

## 2017-07-13 NOTE — IP AVS SNAPSHOT
[employer] Access Code: [email or web address]  Expires: 8/13/2017 11:30 AM    Your email address is not on file at [facility].  Email Addresses are required for you to sign up for [employer], please contact [phone] to verify your personal information and to provide your email address prior to attempting to register for [employer].    Ulises Matthews  [address] [place] [address]    [employer]in[employer]ist  A secure, online tool to manage your health information     [employer]’s [employer]® is a secure, online tool that connects you to your personalized health information from the privacy of your home -- day or night - making it very easy for you to manage your healthcare. Once the activation process is completed, you can even access your medical information using the [employer] amparo, which is available for free in the Apple Amparo store or Google Play store.     To learn more about [employer], visit [email or web address]    There are two levels of access available (as shown below):   My Chart Features  [facility] Primary Care Doctor [facility]  Specialists [facility]  Urgent  Care Non-[facility] Primary Care Doctor   Email your healthcare team securely and privately 24/7 X X X    Manage appointments: schedule your next appointment; view details of past/upcoming appointments X      Request prescription refills. X      View recent personal medical records, including lab and immunizations X X X X   View health record, including health history, allergies, medications X X X X   Read reports about your outpatient visits, procedures, consult and ER notes X X X X   See your discharge summary, which is a recap of your hospital and/or ER visit that includes your diagnosis, lab results, and care plan X X  X     How to register for [employer]:  Once your e-mail address has been verified, follow the following steps to sign up for [employer].     1. Go to  [email or web address]  2. Click on the Sign Up Now box, which takes you to the New Member Sign Up page. You will  need to provide the following information:  a. Enter your [employer] Access Code exactly as it appears at the top of this page. (You will not need to use this code after you’ve completed the sign-up process. If you do not sign up before the expiration date, you must request a new code.)   b. Enter your date of birth.   c. Enter your home email address.   d. Click Submit, and follow the next screen’s instructions.  3. Create a [employer] ID. This will be your [employer] login ID and cannot be changed, so think of one that is secure and easy to remember.  4. Create a [employer] password. You can change your password at any time.  5. Enter your Password Reset Question and Answer. This can be used at a later time if you forget your password.   6. Enter your e-mail address. This allows you to receive e-mail notifications when new information is available in [employer].  7. Click Sign Up. You can now view your health information.    For assistance activating your [employer] account, call [phone]

## 2017-07-13 NOTE — OP REPORT
Preoperative Diagnosis -left inguinal hernia    Postoperative diagnosis - left inguinal hernia    Indication for surgery - 52-year-old male with a symptomatic left inguinal hernia    Procedure(s) -  robotic assisted, laparoscopic left inguinal hernia repair with mesh    Surgeon - Sajan Armendariz. Surgeon - Carmenza Pérez M.D.    Anesthesiologist - Haven Peña M.D.    Anesthesia - GETA    Findings - large left inguinal hernia, indirect. Extensive scar tissue in the left inguinal region. No evidence of a right inguinal hernia.    Procedure narrative - signed informed consent was on the chart of the time of the procedure. The patient was brought to the operating room and placed in the supine position. General endotracheal anesthesia was induced. The skin over the abdomen was prepped and draped in sterile fashion. The patient was instructed to grams of Ancef IV preoperatively. A timeout was performed. At 1st infusing the skin and soft tissue with local anesthetic, which in this case was Percent Marcaine with epinephrine, a 2 cm periumbilical incision was made superior to the umbilicus. The adenoid soft tissue was dissected bluntly to the level of fascia. The base of the stalk of the umbilicus was grasped and lifted, and a Veress needle was introduced into the peritoneal space on the 1st attempt. Pneumoperitoneum was achieved without difficulty and the Veress needle was replaced with a 12 mm trocar. The underlying viscus was inspected, no evidence of trauma was appreciated. The patient was placed into a 15° Trendelenburg position. Both the right and left angle regions were inspected, and there is noted to be a large left inguinal hernia defect with pericolonic fat within the hernia defect. No right inguinal hernia defect was appreciated. At 1st infusing the skin and soft tissue with local anesthetic, 2 separate 1 cm incisions were made, both at the level of the umbilicus, one 10 cm lateral to the  umbilicus on the left and one 10 cm lateral to the umbilicus on the right. Through each of these incisions was placed an 8 mm da Dayana trocar under direct observation of the camera. The da Dayana robot was then docked to the 3 trochars, and I moved to the surgical station. Using the laparoscopic juliana, the peritoneum was divided starting at the left medial umbilical ligament and moving laterally approximately 10 cm and then angling down towards the anterior superior iliac spine. This was begun approximately 5 cm superior to the edge of the hernia defect on the left. The peritoneum was carefully dissected away from the abdominal wall in the left inguinal region sharply. The hernia sac was carefully dissected away from the cord and cord contents. Both the blood supply and the vas deferens were identified and protected during this portion of the procedure. There was noted to be extensive scar tissue and a very large hernia sac, requiring any dissection of approximately 30 minutes before the hernia sac could be completely dissected off of the cord. Eventually was able to dissect the hernia sac completely off of the cord and moved medially. The patient was noted to have a very narrow pelvis, and dissection medially was also somewhat difficult but able to be accomplished. The Stanton's ligament was eventually cleared medially as well as a space to place the mesh both medially and laterally. Once the cord had been cleared and there was space to place the mesh, a 10 x 15 cm Procrit mesh was modified by rounding the corners then folded and placed into the peritoneal space. The mesh was unfolded into the left inguinal region, covering the hernia defect with excellent overlap below the edges of the hernia defect. Mesh was noted to be sitting against the left Stanton's ligament medially. Once the mesh was completely unfolded in proper position, the peritoneum was reapproximated over the mesh using a Stratafix directional suture.  The suture needle was stone in the anterior abdominal wall. The da Dayana robot was undocked from the ports. Using the laps A camera and attachments, the suture needle was removed from the peritoneal space. The patient was placed in the neutral position. The 2 separate 8 mm trochars were removed, and no evidence of bleeding was noted on laparoscopic inspection. The 12 mm port was removed and pneumoperitoneum was allowed to . The fascia at the periumbilical incision was lifted on 2 skin hooks and, being careful not to include any underlying structures, the fascia was reapproximated using a figure-of-eight 0 Vicryl suture. Each of the 3 incisions was irrigated and dried, and each was closed using a 4-0 Vicryl subcuticular stitch. The pain umbilical incision was dressed with 2 x 2 gauze pad followed by clear Tegaderm and the other 2 incisions were dressed with clear Tegaderms.    Estimated blood loss - 10 mL    Fluids - 1100 mL of crystalloid    Specimen(s) -none     Complications - none    Disposition - due to the fact that the patient has no one to care for him at the shelter he lives in and has no right home, he will be kept overnight as an observation patient. He is stable in the postanesthesia care.

## 2017-07-13 NOTE — IP AVS SNAPSHOT
" <p align=\"LEFT\"><IMG SRC=\"//[email or web address]\" alt=\"Image\" WIDTH=\"50%\" HEIGHT=\"200\" BORDER=\"\"></p>                   Name:Ulises Matthews  Medical Record Number:[id number]  CSN: [id number]    YOB: 1965   Age: 52 y.o.  Sex: male  HT:1.829 m (6') WT: 90.6 kg (199 lb 11.8 oz)          Admit Date: 7/13/2017     Discharge Date:   Today's Date: 7/14/2017  Attending Doctor:  Bernardo Hernandez M.D.                  Allergies:  Review of patient's allergies indicates no known allergies.             Medication List      Take these Medications        Instructions    JOHN SELTZER PLUS PO    Take 1 Dose by mouth every 6 hours as needed.   Dose:  1 Dose       ibuprofen 200 MG Tabs   Commonly known as:  MOTRIN    Take 400 mg by mouth every 6 hours as needed.   Dose:  400 mg         "

## 2017-07-14 VITALS
BODY MASS INDEX: 27.05 KG/M2 | TEMPERATURE: 98.2 F | SYSTOLIC BLOOD PRESSURE: 120 MMHG | HEART RATE: 63 BPM | HEIGHT: 72 IN | RESPIRATION RATE: 18 BRPM | OXYGEN SATURATION: 94 % | WEIGHT: 199.74 LBS | DIASTOLIC BLOOD PRESSURE: 72 MMHG

## 2017-07-14 PROBLEM — K40.90 NON-RECURRENT UNILATERAL INGUINAL HERNIA: Status: ACTIVE | Noted: 2017-07-13

## 2017-07-14 PROCEDURE — G0378 HOSPITAL OBSERVATION PER HR: HCPCS

## 2017-07-14 ASSESSMENT — ENCOUNTER SYMPTOMS
CARDIOVASCULAR NEGATIVE: 1
FEVER: 0
RESPIRATORY NEGATIVE: 1
NAUSEA: 0
CHILLS: 0
VOMITING: 0
ABDOMINAL PAIN: 1

## 2017-07-14 ASSESSMENT — PAIN SCALES - GENERAL
PAINLEVEL_OUTOF10: 2

## 2017-07-14 NOTE — CARE PLAN
Problem: Pain Management  Goal: Pain level will decrease to patient’s comfort goal  Outcome: PROGRESSING AS EXPECTED  Patient declines any pharmacological pain intervention.  Cold pack offered and patient states pain level 1 / 10.     Problem: Fluid Volume:  Goal: Will maintain balanced intake and output  Outcome: PROGRESSING AS EXPECTED  Encouraged PO intake of fluids.  Output adequate, clear yellow urine.  No signs/symptoms of urinary retention noted.

## 2017-07-14 NOTE — CARE PLAN
Problem: Psychosocial needs  Goal: Anxiety reduction  Outcome: PROGRESSING AS EXPECTED  Decreased stimuli, dimmed lighting, provided emotional support, reduced noise levels and minimized interruptions.    Problem: Infection  Goal: Will remain free from infection  Outcome: PROGRESSING AS EXPECTED  Reminded patient of the signs and symptoms of infection and encouraged patient to report any of these findings in order to promote best outcomes.

## 2017-07-14 NOTE — PROGRESS NOTES
A&O x4.  VSS.  SpO2 98% on RA.  Ambulating with stand-by assistance, steady gait.  Tolerating diet, denies nausea/vomiting.  C/o 2/10 pain, given ice pack for comfort.  3 abdominal lap stabs with gauze and tegaderm, middle lap site with scant serosanguineous drainage.  Bowel sounds hyperactive.  Last BM pta.    Voiding without difficulty.   Call light and personal belongings within reach. Hourly rounding and fall precautions in place.  No additional needs at this time.

## 2017-07-14 NOTE — DISCHARGE PLANNING
Pt requested to meet with KRISTYN.  KRISTYN met with pt at bedside.  Pt wondering if he can fill his Rx here at [facility] with his HPN Medicaid.  KRISTYN contacted [facility] @ [facility] and spoke with Jaclyn who states pt can fill his medication here at [facility].  Pt states he has a greyhound ticket for today leaving at 1450, going to [facility] [place].  KRISTYN notified bedside RN that pt requesting to d/c ASAP in order to ensure he makes his bus on time.  RN agreeable to plan.

## 2017-07-14 NOTE — PROGRESS NOTES
Surgical Progress Note    Author: Bernardo Hernandez Date & Time created: 2017   9:48 AM     Interval Events:  Pain well controlled over night.  Tolerating diet.  Passing flatus.  Feels ready for discharge home.    Review of Systems   Constitutional: Negative for fever, chills and malaise/fatigue.   Respiratory: Negative.    Cardiovascular: Negative.    Gastrointestinal: Positive for abdominal pain. Negative for nausea and vomiting.     Hemodynamics:  Temp (24hrs), Av.6 °C (97.9 °F), Min:36.1 °C (97 °F), Max:37.1 °C (98.8 °F)  Temperature: 36.8 °C (98.2 °F)  Pulse  Av.2  Min: 62  Max: 81Heart Rate (Monitored): 74  Blood Pressure: 120/72 mmHg, NIBP: 122/80 mmHg     Respiratory:    Respiration: 18, Pulse Oximetry: 94 %           Neuro:  GCS = 15       Fluids:    Intake/Output Summary (Last 24 hours) at 17 0948  Last data filed at 17 0500   Gross per 24 hour   Intake   3810 ml   Output   3705 ml   Net    105 ml     Weight: 90.6 kg (199 lb 11.8 oz)  Current Diet Order   Procedures   • Diet Order     Physical Exam   Constitutional: He is oriented to person, place, and time. He appears well-developed and well-nourished. No distress.   Eyes: Pupils are equal, round, and reactive to light. No scleral icterus.   Cardiovascular: Normal rate, regular rhythm and normal heart sounds.    Pulmonary/Chest: Effort normal and breath sounds normal. No respiratory distress.   Abdominal: Soft. Bowel sounds are normal. He exhibits no distension. There is tenderness (minimal, at incisions). There is no rebound and no guarding.   Genitourinary:   Inguinal hernia repair intact on left   Musculoskeletal: He exhibits no edema.   Neurological: He is alert and oriented to person, place, and time.   Skin: Skin is warm and dry.   Psychiatric: He has a normal mood and affect. His behavior is normal. Judgment and thought content normal.     Labs:  Recent Results (from the past 24 hour(s))   URINE DRUG SCREEN    Collection  Time: 07/13/17  1:26 PM   Result Value Ref Range    Amphetamines Urine Negative Negative    Barbiturates Negative Negative    Benzodiazepines Negative Negative    Cocaine Metabolite Negative Negative    Methadone Negative Negative    Opiates Negative Negative    Oxycodone Negative Negative    Phencyclidine -Pcp Negative Negative    Propoxyphene Negative Negative    Cannabinoid Metab Negative Negative     Medical Decision Making, by Problem:  Active Hospital Problems    Diagnosis   •    • Inguinal hernia unilateral, non-recurrent [K40.90]     Plan:  Discharge home today, follow up with me, Dr. Storey as previously appointed or in 10-14 days    Quality Measures:  Medications reviewed  Stout catheter: No Stout      DVT Prophylaxis: Enoxaparin (Lovenox)  DVT prophylaxis - mechanical: SCDs  Ulcer prophylaxis: Not indicated          Discussed patient condition with Patient

## 2017-07-14 NOTE — DISCHARGE INSTRUCTIONS
Discharge Instructions    Discharged to other by car with self. Discharged via walking, hospital escort: Refused.  Special equipment needed: Not Applicable    Be sure to schedule a follow-up appointment with your primary care doctor or any specialists as instructed.     Discharge Plan:   Diet Plan: Discussed  Activity Level: Discussed  Smoking Cessation Offered: Patient Counseled  Confirmed Follow up Appointment: Patient to Call and Schedule Appointment  Confirmed Symptoms Management: Discussed  Influenza Vaccine Indication: Indicated: Not available from distributor/    I understand that a diet low in cholesterol, fat, and sodium is recommended for good health. Unless I have been given specific instructions below for another diet, I accept this instruction as my diet prescription.   Other diet: Regular    Special Instructions: None    · Is patient discharged on Warfarin / Coumadin?   No     · Is patient Post Blood Transfusion?  No    Depression / Suicide Risk    As you are discharged from this [facility] facility, it is important to learn how to keep safe from harming yourself.    Recognize the warning signs:  · Abrupt changes in personality, positive or negative- including increase in energy   · Giving away possessions  · Change in eating patterns- significant weight changes-  positive or negative  · Change in sleeping patterns- unable to sleep or sleeping all the time   · Unwillingness or inability to communicate  · Depression  · Unusual sadness, discouragement and loneliness  · Talk of wanting to die  · Neglect of personal appearance   · Rebelliousness- reckless behavior  · Withdrawal from people/activities they love  · Confusion- inability to concentrate     If you or a loved one observes any of these behaviors or has concerns about self-harm, here's what you can do:  · Talk about it- your feelings and reasons for harming yourself  · Remove any means that you might use to hurt yourself (examples:  pills, rope, extension cords, firearm)  · Get professional help from the community (Mental Health, Substance Abuse, psychological counseling)  · Do not be alone:Call your Safe Contact- someone whom you trust who will be there for you.  · Call your local CRISIS HOTLINE 778-6333 or 995-507-9554  · Call your local Children's Mobile Crisis Response Team [place] (131) 766-3082 or www.FreedomPay  · Call the toll free National Suicide Prevention Hotlines   · National Suicide Prevention Lifeline 189-854-HNQO (4130)  · Recovers Hope Line Network 800-SUICIDE (330-4686)    Hernia Repair  Care After  These instructions give you information on caring for yourself after your procedure. Your doctor may also give you more specific instructions. Call your doctor if you have any problems or questions after your procedure.  HOME CARE   · You may have changes in your poops (bowel movements).  · You may have loose or watery poop (diarrhea).  · You may be not able to poop.  · Your bowels will slowly get back to normal.  · Do not eat any food that makes you sick to your stomach (nauseous). Eat small meals 4 to 6 times a day instead of 3 large ones.  · Do not drink pop. It will give you gas.  · Do not drink alcohol.  · Do not lift anything heavier than 10 pounds. This is about the weight of a gallon of milk.  · Do not do anything that makes you very tired for at least 6 weeks.  · Do not get your wound wet for 2 days.  · You may take a sponge bath during this time.  · After 2 days you may take a shower. Gently pat your surgical cut (incision) dry with a towel. Do not rub it.  · For men: You may have been given an athletic supporter (scrotal support) before you left the hospital. It holds your scrotum and testicles closer to your body so there is no strain on your wound. Wear the supporter until your doctor tells you that you do not need it anymore.  GET HELP RIGHT AWAY IF:  · You have watery poop, or cannot poop for more than 3  days.  · You feel sick to your stomach or throw up (vomit) more than 2 or 3 times.  · You have temperature by mouth above 102° F (38.9° C).  · You see redness or puffiness (swelling) around your wound.  · You see yellowish white fluid (pus) coming from your wound.  · You see a bulge or bump in your lower belly (abdomen) or near your groin.  · You develop a rash, trouble breathing, or any other symptoms from medicines taken.  MAKE SURE YOU:  · Understand these instructions.  · Will watch your condition.  · Will get help right away if your are not doing well or get worse.  Document Released: 11/30/2009 Document Revised: 03/11/2013 Document Reviewed: 11/30/2009  [employer]® Patient Information ©2014 [employer], [employer].

## 2017-07-28 NOTE — ADDENDUM NOTE
Encounter addended by: Sagrario Phelps R.N. on: 7/27/2017  5:56 PM<BR>     Documentation filed: Inpatient Document Flowsheet

## 2017-08-01 NOTE — PROGRESS NOTES
Chief Complaint   Patient presents with   • Surgical Followup     post op        SUBJECTIVE:  Elise Mantilla is a 68 year old female who presents for postoperative follow-up. The patient was initially seen in my office on 5/1/2017 with complaints of symptomatic pelvic organ prolapse including vaginal prolapse of posterior compartment prolapse..    On 6/6/2017 she underwent definitive surgical management with posterior colpoperineorrhaphy, sacrospinous vault fixation with biologic graft augmentation and reduction of cystocele and cystoscopy. She tolerated the procedures well. There were no intraoperative complications.    She contacted the office on 6/22/17 with complaints of sudden urgency without warning to urinate. She has 4-5 leakage episodes per day, and is wearing pads. She denies dysuria, hematuria, cloudy or malodorous urine. Urine dipstick was performed which is unremarkable.  A review of my initial consultation from May 1, 2017 did not identify any significant voiding complaints. Intraoperative cystoscopy was also unremarkable.    Overall she is recovering well from surgery. She denies incisional pain, nausea or vomiting. Her energy level is good. She is taking MiraLAX daily with no postoperative constipation noted. Sensation of vaginal bulging, stool trapping and external manipulation to facilitate stool evacuation have resolved.      Past Surgical History:   Procedure Laterality Date   • APPENDECTOMY     • BREAST SURGERY  1994; 2004    Left breast bx's; benign findings    • CARPAL TUNNEL RELEASE  1996    Carpal Tunnel, Right    • COLONOSCOPY ABLATE LESION  07/11/2007    polypectomy   • COSMETIC SURGERY  1986    Tummy tucks   • DEXA BONE DENSITY AXIAL SKELETON  12/19/2008   • ESOPHAGOGASTRODUODENOSCOPY TRANSORAL FLEX W/BX SINGLE OR MULT  05/22/2007    EGD with Bx   • EXCISION OF LINGUAL TONSIL     • FACELIFT  2002   • HYSTERECTOMY  1982    KIM    • INCISE FINGER TENDON SHEATH  12/14/2006    Right Trigger  Received PACU report and assumed care of patient upon arrival to [facility] in [facility].  Assessment complete; discussed POC with patient.  AO x4, patient calls for assistance.  Patient appears calm and exhibits no signs of distress.  Patient reports pain of 3/10, declined interventions for pain.  3x abdominal lap stabs with tegaderm CDI.  Patient tolerating current diet advancing as tolerated.  BS normoactive x 4, LBM PTA, patient voids ambulating to BR PRN.  Patient is standby assist, gait steady.  Call light within reach, bed in lowest position, SCDs in use, bed alarm refused.  Will continue to monitor pt for changes in status and provide care.   finger Release    • LEFT HEART CATH,PERCUTANEOUS      Cardiac Cath   • PAP SMEAR,ROUTINE  2009   • PARTIAL NEPHRECTOMY  1969    Right    • REMOVAL GALLBLADDER  1977    Cholecystectomy (gallbladder)   • SERVICE TO GASTROENTEROLOGY  03-15-13    colonoscopy and EGD with BX's    • SERVICE TO GASTROENTEROLOGY  2007    EGD with Bx's    • SERVICE TO UROLOGY     • TONSILLECTOMY AND ADENOIDECTOMY       Past Medical History:   Diagnosis Date   • Absent kidney, acquired     right    • Acute bronchitis    • Anxiety    • Chronic kidney disease     \"floating kidney removed\"   • COPD (chronic obstructive pulmonary disease) (CMS/[facility])    • Depression    • Diverticula of colon    • Duodenitis    • H/O gastritis    • H/O insomnia    • Hemorrhoids    • Hiatal hernia    • Hx of colonic polyps    • Hx of pregnancy      0 1 2   • Hx of tinnitus     chronic    • Hypertension    • Inflammatory bowel disease    • Other and unspecified hyperlipidemia    • Polyp of duodenum    • PONV (postoperative nausea and vomiting)    • Rheumatic fever     as a child.   • Unspecified hypothyroidism    • Unspecified otitis media    • Urinary tract infection      Family History   Problem Relation Age of Onset   • Stroke Mother    • Hypertension Mother    • Substance abuse Mother      ETOH abuse    • Substance abuse Father      ETOH abuse    • Cirrhosis Father    • Mental retardation Sister    • Kidney disease Sister    • Stroke Brother    • Substance abuse Brother    • Asthma Son    • Arthritis Sister    • Asthma Sister    • COPD Sister    • Depression Sister    • High cholesterol Sister      Social History     Social History   • Marital status:      Spouse name: N/A   • Number of children: N/A   • Years of education: N/A     Occupational History   • Not on file.     Social History Main Topics   • Smoking status: Never Smoker   • Smokeless tobacco: Never Used      Comment: was a   around smoke x 23 years   • Alcohol use 1.2 oz/week     2 Glasses of wine per week      Comment: \"cut down to 2 glasses per week since 01/13\"   • Drug use: No   • Sexual activity: No     Other Topics Concern   • Not on file     Social History Narrative   • No narrative on file        ALLERGIES:  No Known Allergies  Current Outpatient Prescriptions   Medication Sig Dispense Refill   • HYDROcodone-acetaminophen (NORCO) 5-325 MG per tablet Take 1 tablet by mouth every 6 hours as needed for Pain. 30 tablet 0   • metoPROLOL (LOPRESSOR) 100 MG tablet Take 1 tablet by mouth 2 times daily. 30 tablet 5   • benazepril (LOTENSIN) 40 MG tablet Take 1 tablet by mouth daily. 30 tablet 5   • ezetimibe (ZETIA) 10 MG tablet Take 1 tablet by mouth daily. RECHECK FASTING LABS IN 3 MONTHS. 30 tablet 2   • hydrochlorothiazide (HYDRODIURIL) 25 MG tablet Take 1 tablet by mouth daily. 30 tablet 5   • ibuprofen (MOTRIN) 400 MG tablet Take 400 mg by mouth every 6 hours as needed for Pain.     • aspirin 81 MG chewable tablet Chew 1 tablet by mouth daily.     • folic acid (FOLATE) 1 MG tablet Take 1 mg by mouth daily.     • Multiple Vitamin (MULTIVITAMIN) capsule Take 1 capsule by mouth daily.     • Omega-3 Fatty Acids (OMEGA 3 PO) Take  by mouth.     • azithromycin (ZITHROMAX) 250 MG tablet Take 2 tablets by mouth the first day and then 1 tablet by mouth thereafter until gone. 6 tablet 0   • benzonatate (TESSALON PERLES) 200 MG capsule Take 1 capsule by mouth 3 times daily as needed for Cough. 30 capsule 0   • ALPRAZolam (XANAX) 1 MG tablet Take 1 tablet by mouth nightly as needed for Sleep. 30 tablet 0   • hydroCORTisone (ANUSOL-HC) 2.5 % rectal cream Use every night for 10-14 days. 30 g 0   • mirabegron ER (MYRBETRIQ) 50 MG 24 hr tablet Take 1 tablet by mouth once daily. 30 tablet 6     No current facility-administered medications for this visit.         Review of systems:    Genitourinary: See history of present  illness      Physical Exam:   Vital Signs: Blood pressure 140/90, pulse 60, resp. rate 14, height 5' 4\" (1.626 m), weight 70.1 kg.  General: The patient is well developed, well nourished, in no acute distress, appears stated age.   Pulmonary: No use of accessory muscles. No dyspnea on room air. No wheezing.  Phychiatric: Oriented to person, place, and time. Memory intact. Mood and affect are appropriate for situation. The patient is pleasant and cooperative throughout the exam.   Abdomen: Soft and nondistended. No rebound or guarding. No suprapubic distention.  Skin: Warm and dry, normal turgor.  Genitourinary:  External genitalia without rash or lesion. Urethral meatus normal. No meatal discharge. Vaginal sutures intact. No prolapse. No abnormal odor. Intravaginal palpation of the bladder within normal limits.    LAB RESULTS:  COLOR (no units)   Date Value   06/28/2017 yellow     APPEARANCE (no units)   Date Value   06/28/2017 clear     SPECIFIC GRAVITY (no units)   Date Value   06/28/2017 1.025     pH (no units)   Date Value   06/28/2017 6.0     PROTEIN(URINE) (no units)   Date Value   06/28/2017 negative     GLUCOSE(URINE) (no units)   Date Value   06/28/2017 negative     KETONES (no units)   Date Value   06/28/2017 negative     BILIRUBIN (no units)   Date Value   06/28/2017 negative     BLOOD (no units)   Date Value   06/28/2017 negative     NITRITE (no units)   Date Value   06/28/2017 negative     UROBILINOGEN (no units)   Date Value   06/28/2017 0.2 e.u./dL     LEUKOCYTE ESTERASE (no units)   Date Value   06/28/2017 negative     Lab Results   Component Value Date    SODIUM 136 06/07/2017    POTASSIUM 4.7 06/07/2017    CHLORIDE 104 06/07/2017    CO2 24 06/07/2017    BUN 13 06/07/2017    CREATININE 0.82 06/07/2017    GLUCOSE 166 (H) 06/07/2017     WBC (K/mcL)   Date Value   06/07/2017 8.2     HCT (%)   Date Value   06/07/2017 34.2 (L)     HGB (g/dL)   Date Value   06/07/2017 11.9 (L)     PLT (K/mcL)   Date Value    06/07/2017 194   05/23/2013 209     Impression: No evidence of urinary tract infection    Assessment & plan:   1.  Postoperative urinary urge and urge incontinence.  Patient was reassured.  Myrbetriq prescribed to side effects discussed with the patient to include headaches and hypertension.  She will return for follow-up in 6 weeks and recheck of overactive bladder symptoms.    2. Symptomatic posterior compartment pelvic organ prolapse and mild vault descent.  Healing well.  Symptoms and physical exam findings have resolved.    I

## 2017-10-28 ENCOUNTER — HOSPITAL ENCOUNTER (EMERGENCY)
Facility: MEDICAL CENTER | Age: 52
End: 2017-10-30
Attending: EMERGENCY MEDICINE
Payer: MEDICAID

## 2017-10-28 DIAGNOSIS — F15.10 METHAMPHETAMINE ABUSE (HCC): ICD-10-CM

## 2017-10-28 DIAGNOSIS — R45.851 SUICIDAL IDEATION: ICD-10-CM

## 2017-10-28 DIAGNOSIS — F23 ACUTE PSYCHOSIS (HCC): ICD-10-CM

## 2017-10-28 LAB
AMPHET UR QL SCN: POSITIVE
BARBITURATES UR QL SCN: NEGATIVE
BENZODIAZ UR QL SCN: NEGATIVE
BZE UR QL SCN: NEGATIVE
CANNABINOIDS UR QL SCN: NEGATIVE
METHADONE UR QL SCN: NEGATIVE
OPIATES UR QL SCN: NEGATIVE
OXYCODONE UR QL SCN: NEGATIVE
PCP UR QL SCN: NEGATIVE
POC BREATHALIZER: 0 PERCENT (ref 0–0.01)
PROPOXYPH UR QL SCN: NEGATIVE

## 2017-10-28 PROCEDURE — 302970 POC BREATHALIZER: Performed by: EMERGENCY MEDICINE

## 2017-10-28 PROCEDURE — 90791 PSYCH DIAGNOSTIC EVALUATION: CPT

## 2017-10-28 PROCEDURE — 80307 DRUG TEST PRSMV CHEM ANLYZR: CPT

## 2017-10-28 PROCEDURE — 302970 POC BREATHALIZER

## 2017-10-28 PROCEDURE — 99285 EMERGENCY DEPT VISIT HI MDM: CPT

## 2017-10-28 ASSESSMENT — LIFESTYLE VARIABLES
TOTAL SCORE: 3
ANXIETY: MILDLY ANXIOUS
TOTAL SCORE: 4
EVER FELT BAD OR GUILTY ABOUT YOUR DRINKING: YES
TOTAL SCORE: 4
TOTAL SCORE: 4
HOW MANY TIMES IN THE PAST YEAR HAVE YOU HAD 5 OR MORE DRINKS IN A DAY: 10
ORIENTATION AND CLOUDING OF SENSORIUM: CANNOT DO SERIAL ADDITIONS OR IS UNCERTAIN ABOUT DATE
HEADACHE, FULLNESS IN HEAD: VERY MILD
HAVE PEOPLE ANNOYED YOU BY CRITICIZING YOUR DRINKING: YES
AGITATION: NORMAL ACTIVITY
DOES PATIENT WANT TO STOP DRINKING: NO
NAUSEA AND VOMITING: NO NAUSEA AND NO VOMITING
AVERAGE NUMBER OF DAYS PER WEEK YOU HAVE A DRINK CONTAINING ALCOHOL: 3
ON A TYPICAL DAY WHEN YOU DRINK ALCOHOL HOW MANY DRINKS DO YOU HAVE: 2
AUDITORY DISTURBANCES: NOT PRESENT
PAROXYSMAL SWEATS: NO SWEAT VISIBLE
TREMOR: NO TREMOR
HAVE YOU EVER FELT YOU SHOULD CUT DOWN ON YOUR DRINKING: YES
DO YOU DRINK ALCOHOL: YES
EVER HAD A DRINK FIRST THING IN THE MORNING TO STEADY YOUR NERVES TO GET RID OF A HANGOVER: YES
VISUAL DISTURBANCES: NOT PRESENT
CONSUMPTION TOTAL: POSITIVE

## 2017-10-28 ASSESSMENT — PAIN SCALES - GENERAL: PAINLEVEL_OUTOF10: 0

## 2017-10-29 PROCEDURE — 700102 HCHG RX REV CODE 250 W/ 637 OVERRIDE(OP): Performed by: EMERGENCY MEDICINE

## 2017-10-29 PROCEDURE — A9270 NON-COVERED ITEM OR SERVICE: HCPCS | Performed by: EMERGENCY MEDICINE

## 2017-10-29 RX ORDER — IBUPROFEN 600 MG/1
600 TABLET ORAL ONCE
Status: COMPLETED | OUTPATIENT
Start: 2017-10-29 | End: 2017-10-29

## 2017-10-29 RX ADMIN — IBUPROFEN 600 MG: 600 TABLET, FILM COATED ORAL at 16:27

## 2017-10-29 NOTE — ED PROVIDER NOTES
ED Provider Note    The patient is awaiting transfer to mental health. He is resting currently, has no complaints. He ate breakfast, denies any nausea or abdominal pain. Patient remains depressed with suicidal ideation.    Wicho Alvarado M.D.

## 2017-10-29 NOTE — DISCHARGE PLANNING
Medical Social Work     Referral: Legal Hold     Intervention: Legal Hold Paperwork given to SW by [employer]     Legal Hold Initiated: Date: 10/28/17 Time: 1912     Patient’s Insurance Listed on Face Sheet:Medicaid HMO     Referrals sent to: [facility] and [facility]     This referral contains the following information:  1. Face sheet __x__  2. Page 1 and Page 2 of Legal Hold _x___  3. Alert Team Assessment/Psych Assessment _x___  4. Head to toe physical exam __x__  5. Urine Drug Screen _x___  6. Blood Alcohol ___x_  7. Vital signs __x__  8. Pregnancy test when applicable _x__  9. Medications list _x___     Plan: Patient will transfer to mental health facility once acceptance is obtained

## 2017-10-29 NOTE — ED NOTES
Pt is sleeping on left side, no needs at this time. Pt is with in direct line of site of this nurse and pod staff at this time.

## 2017-10-29 NOTE — CONSULTS
"[facility]   TRIAGE ASSESSMENT    Name: Ulises Matthews  MRN: [id number]  : 1965  Age: 52 y.o.  Date of assessment: 10/28/2017  PCP: Pcp Pt States None  Persons in attendance: Patient    CHIEF COMPLAINT/PRESENTING ISSUE (as stated by Patient): Patient seen lying on hospital bed, drowsy but able to be aroused and engage in interview. Patient reports \"people want to kill me\". Patient states, \"I hear that, they are here\". Patient reports he cannot see them but believes they are in the in the hospital. Patient observed looking around as if to search for the people who he believes are trying to harm him. Patient states \"the front door\". When asked to explain what he meant regarding the statement, patient spoke in an inaudible voice, and nonsensical language. Patient reports experiencing suicidal ideations, no clear plan or intent. Patient speech was disorganized and tangential throughout the interview.  Chief Complaint   Patient presents with   • Drug Abuse   • Suicidal Ideation        CURRENT LIVING SITUATION/SOCIAL SUPPORT: Patient states he is currently homeless. Patient's family resides in [place] except for one 16 year old son in [place].    BEHAVIORAL HEALTH TREATMENT HISTORY  Does patient/parent report a history of prior behavioral health treatment for patient?   Yes:    Dates Level of Care Facilty/Provider Diagnosis/Problem Medications   Patient unable to give report of previous psychiatric history                                                                              SAFETY ASSESSMENT - SELF  Does patient acknowledge current or past symptoms of dangerousness to self? yes  Does parent/significant other report patient has current or past symptoms of dangerousness to self? N\A  Does presenting problem suggest symptoms of dangerousness to self? No    SAFETY ASSESSMENT - OTHERS  Does patient acknowledge current or past symptoms of aggressive behavior or risk to others? no  Does " "parent/significant other report patient has current or past symptoms of aggressive behavior or risk to others?  N\A  Does presenting problem suggest symptoms of dangerousness to others? No    Crisis Safety Plan completed and copy given to patient? no    ABUSE/NEGLECT SCREENING  Does patient report feeling “unsafe” in his/her home, or afraid of anyone?  yes  Does patient report any history of physical, sexual, or emotional abuse?  no  Does parent or significant other report any of the above? N\A  Is there evidence of neglect by self?  yes  Is there evidence of neglect by a caregiver? no  Does the patient/parent report any history of CPS/APS/police involvement related to suspected abuse/neglect or domestic violence? no  Based on the information provided during the current assessment, is a mandated report of suspected abuse/neglect being made?  No    SUBSTANCE USE SCREENING  Yes:  Jacky all substances used in the past 30 days:      Last Use Amount   []   Alcohol     []   Marijuana     []   Heroin     []   Prescription Opioids  (used without prescription, for    recreation, or in excess of prescribed amount)     []   Other Prescription  (used without prescription, for    recreation, or in excess of prescribed amount)     []   Cocaine      []   Methamphetamine     []   \"\" drugs (ectasy, MDMA)     []   Other substances        UDS results: pending  Breathalyzer results: 0.003    What consequences does the patient associate with any of the above substance use and or addictive behaviors? Work problems or losses: , Family problems: , Health problems:     Risk factors for detox (check all that apply):  []  Seizures   []  Diaphoretic (sweating)   []  Tremors   []  Hallucinations   []  Increased blood pressure   []  Decreased blood pressure   []  Other   []  None      [] Patient education on risk factors for detoxification and instructed to return to ER as needed.        MENTAL STATUS   Participation: Limited verbal " participation  Grooming: Disheveled  Orientation: Drowsy/Somnolent  Behavior: Calm  Eye contact: Poor  Mood: Anxious  Affect: Labile  Thought process: Flight of ideas and Loose associations  Thought content: Obsessions  Speech: Pressured  Perception: Evidence of auditory hallucination  Memory:  Poor memory for chronology of events  Insight: Poor  Judgment:  Poor  Other:    Collateral information:   Source:  [] Significant other present in person:   [] Significant other by telephone  [] [facility]   [x] [facility] Nursing Staff  [x] [facility]  [] Other:     [] Unable to complete full assessment due to:  [] Acute intoxication  [] Patient declined to participate/engage  [] Patient verbally unresponsive  [] Significant cognitive deficits  [] Significant perceptual distortions or behavioral disorganization  [] Other:      CLINICAL IMPRESSIONS:  Primary:  Psychosis NOS  Secondary:  Deferred       IDENTIFIED NEEDS/PLAN:  [Trigger DISPOSITION list for any items marked]    []  Imminent safety risk - self [] Imminent safety risk - others   []  Acute substance withdrawal [x]  Psychosis/Impaired reality testing   [x]  Mood/anxiety []  Substance use/Addictive behavior   []  Maladaptive behaviro []  Parent/child conflict   []  Family/Couples conflict []  Biomedical   []  Housing []  Financial   []   Legal  Occupational/Educational   []  Domestic violence []  Other:     Disposition: [facility] to [facility]    Does patient express agreement with the above plan? yes    Referral appointment(s) scheduled? N\A    Alert team only:   I have discussed findings and recommendations with Dr. Osorio who is in agreement with these recommendations.     Referral information sent to the following community providers :    If applicable : Referred  to : [facility] for legal hold follow up at (time): 11:30 pm  Alix Rowe, Ph.D.  10/28/2017

## 2017-10-29 NOTE — ED PROVIDER NOTES
"ED Provider Note    Scribed for Dr. Sulaiman Osorio M.D. by Esteban Hendrix. 10/28/2017  7:43 PM    Primary care provider: Pcp Pt States None  Means of arrival: EMS  History obtained from: Patient  History limited by: None    CHIEF COMPLAINT  Chief Complaint   Patient presents with   • Drug Abuse   • Suicidal Ideation       HPI  Ulises Matthews is a 52 y.o. male who presents to the Emergency Department for evaluation of suicidal ideations with most recent episode earlier today prior to arrival. Patient also reports associated depression which exacerbates his suicidal ideations. He notes alcohol consumption and meth use today. Patient otherwise does not report any other associated symptoms at this time. He does not report any pain or recent fevers.    REVIEW OF SYSTEMS  Pertinent positives include suicidal ideations, depression. Pertinent negatives include no pain, recent fevers. As above, all other systems reviewed and are negative.   See HPI for further details.   C    PAST MEDICAL HISTORY   has a past medical history of Psychiatric disorder and Suicidal behavior.    SURGICAL HISTORY   has a past surgical history that includes other abdominal surgery and inguinal hernia repair robotic (Left, 7/13/2017).    SOCIAL HISTORY  Social History   Substance Use Topics   • Smoking status: Current Every Day Smoker     Packs/day: 0.50     Years: 22.00     Types: Cigarettes   • Smokeless tobacco: Never Used   • Alcohol use Yes      Comment: \"Couple of beers\" - daily      History   Drug Use   • Types: Intravenous, Inhaled, Injected (Skin Popping), Oral     Comment: Meth, cocaine, marijuana x3/wk.  7/12/2017 - \"No more\" last used drugs \"a month ago\".       FAMILY HISTORY  History reviewed. No pertinent family history.    CURRENT MEDICATIONS  Home Medications     Reviewed by Amilcar Valle R.N. (Registered Nurse) on 10/28/17 at 1923  Med List Status: Complete   Medication Last Dose Status   ibuprofen (MOTRIN) 200 MG Tab " "7/11/2017 Active   Phenyleph-Doxylamine-DM-APAP (JOHN SELTZER PLUS PO) 7/11/2017 Active                ALLERGIES  No Known Allergies    PHYSICAL EXAM  VITAL SIGNS: /93   Pulse (!) 107   Temp 37.1 °C (98.7 °F)   Resp 14   Ht 1.88 m (6' 2\")   Wt 90.3 kg (199 lb)   SpO2 98%   BMI 25.55 kg/m²   Constitutional: Well developed, Well nourished, no distress, Non-toxic appearance.   HENT: Normocephalic, Atraumatic, Bilateral external ears normal   Thorax & Lungs: Non labored breathing   Skin: Warm, Dry  Extremities:, No edema   Musculoskeletal: No major deformities noted.   Neurologic: Awake, alert. Moves all extremities spontaneously.  Psychiatric: Depressed affect, judgement normal.      LABS  Results for orders placed or performed during the hospital encounter of 10/28/17   URINE DRUG SCREEN (TRIAGE)   Result Value Ref Range    Amphetamines Urine Positive (A) Negative    Barbiturates Negative Negative    Benzodiazepines Negative Negative    Cocaine Metabolite Negative Negative    Methadone Negative Negative    Opiates Negative Negative    Oxycodone Negative Negative    Phencyclidine -Pcp Negative Negative    Propoxyphene Negative Negative    Cannabinoid Metab Negative Negative   POC BREATHALIZER   Result Value Ref Range    POC Breathalizer 0.003 0.00 - 0.01 Percent      All labs reviewed by me.    COURSE & MEDICAL DECISION MAKING  Pertinent Labs & Imaging studies reviewed. (See chart for details)    7:43 PM - Patient seen and examined at bedside. Discussed plan of care which includes consult with [facility] and breathalizer test. He understands and agrees. Ordered POC breathalizer to evaluate his symptoms.     7:49 PM - I discussed the patient's case and the above findings with [facility] who will evaluate the patient.     Decision Making:  Vital to be actively committal forms completed, plan transfer to mental health facility    FINAL IMPRESSION  1. Suicidal ideation          IEsteban " (Scribe), am scribing for, and in the presence of, Sulaiman Osorio M.D..    Electronically signed by: Esteban Hendrix (Scribe), 10/28/2017    ISulaiman M.D. personally performed the services described in this documentation, as scribed by Esteban Hendrix in my presence, and it is both accurate and complete.    The note accurately reflects work and decisions made by me.  Sulaiman Osorio  10/28/2017  9:43 PM

## 2017-10-29 NOTE — ED NOTES
Pt is in direct observation of the nurse and pod staff, staff has been alerted to pt SAD status and legal hold in place in binder

## 2017-10-29 NOTE — ED NOTES
Med rec complete per patient.  Pt denies taking any medications, OTC or Rx.  Allergies reviewed - NKDA.

## 2017-10-29 NOTE — ED NOTES
JOCE PRINGLE from [facility]. Pt called 911, reports has had Depression and suicidal thoughts x 5 days, last meth dose this AM.  142/101, hr 109. r 18. Sat 92% RA.  fsbs 102.  Has also been drinking alcohol. Denies plan.

## 2017-10-29 NOTE — ED NOTES
Patient's home medications have been reviewed by the pharmacy team.     Past Medical History:   Diagnosis Date   • Psychiatric disorder     depression   • Suicidal behavior     hx of drug induced SI       Patient's Medications   New Prescriptions    No medications on file   Previous Medications    No medications on file   Modified Medications    No medications on file   Discontinued Medications    IBUPROFEN (MOTRIN) 200 MG TAB    Take 400 mg by mouth every 6 hours as needed.    PHENYLEPH-DOXYLAMINE-DM-APAP (JOHN SELTZER PLUS PO)    Take 1 Dose by mouth every 6 hours as needed.          A:  Patient denies taking any medications, therefore medications do not appear to be contributing to current complaints.     P:    No recommendations at this time. Defer to psych team for initiation of medications.      Aniya Bingham, PHARMD

## 2017-10-30 ENCOUNTER — HOSPITAL ENCOUNTER (EMERGENCY)
Facility: MEDICAL CENTER | Age: 52
End: 2017-10-30
Attending: EMERGENCY MEDICINE
Payer: MEDICAID

## 2017-10-30 VITALS
WEIGHT: 199 LBS | BODY MASS INDEX: 25.55 KG/M2 | RESPIRATION RATE: 16 BRPM | SYSTOLIC BLOOD PRESSURE: 123 MMHG | HEART RATE: 70 BPM | DIASTOLIC BLOOD PRESSURE: 76 MMHG | TEMPERATURE: 97.2 F

## 2017-10-30 VITALS
HEART RATE: 79 BPM | RESPIRATION RATE: 16 BRPM | WEIGHT: 199 LBS | BODY MASS INDEX: 25.54 KG/M2 | TEMPERATURE: 98.8 F | SYSTOLIC BLOOD PRESSURE: 96 MMHG | HEIGHT: 74 IN | DIASTOLIC BLOOD PRESSURE: 64 MMHG | OXYGEN SATURATION: 94 %

## 2017-10-30 DIAGNOSIS — F15.10 METHAMPHETAMINE ABUSE (HCC): ICD-10-CM

## 2017-10-30 PROCEDURE — 99284 EMERGENCY DEPT VISIT MOD MDM: CPT

## 2017-10-30 ASSESSMENT — PAIN SCALES - GENERAL: PAINLEVEL_OUTOF10: 0

## 2017-10-30 NOTE — DISCHARGE INSTRUCTIONS
Amphetamine Abuse  Follow up for meth detox.  Followup with psych as referred.  Do not use alcohol.    Meth and other amphetamines over-stimulate the nervous system. This gives you a false feeling of power and confidence. Amphetamines once came in the form of diet pills. This is no longer considered a valid reason to use the drug. More often they are bought as the illegal drug, methamphetamine. It is also known as crank, crystal, speed, or ice. Meth and similar drugs can cause a variety of problems. They can cause severe physical and psychological problems.  SYMPTOMS   · Reduced appetite.  · Dry mouth.  · Erectile dysfunction.  · Headache.  · Fever and sweating.  · Diarrhea or constipation.  · Blurred vision and impaired speech.  · Dizziness, uncontrollable movements or shaking.  · Restlessness.  · Palpitations and irregular heartbeat.  · Anxiety and/or general nervousness.  Long-term problems:  · Convulsions.  · Heart attack.  · Poor skin color.  · A mental state that mimics serious psychiatric illness.  · Emotional instability.  · Aggression.  · Dry or itchy skin.  · Acne.  RISKS AND COMPLICATIONS  Risks associated with needle use and inhaling include:  · Infection.  · Vein damage.  · Overdose.  · Skin abscesses.  · Hepatitis.  · AIDS.  TREATMENT   There are 2 types:   · Short-term medical treatment. This helps to preserve life and prevent or minimize damage from the problems described above.  · Long-term substance abuse treatment. This helps to achieve recovery from drug abuse or addiction.  HOME CARE INSTRUCTIONS   After treatment discharge, it is essential to do the following:  · Follow all instructions from your caregiver very carefully.  · Take all medications as prescribed. If you cannot, contact your caregiver right away.  · Keep all appointments for further evaluation and counseling.  · Do not use drugs, alcohol or any other mind and mood altering drugs unless prescribed by your doctor.  · Do not operate a  motor vehicle or machinery until your caregiver says it is OK.  SEEK IMMEDIATE MEDICAL CARE IF:   · You have a seizure (convulsions).  · You become very shaky or tense.  · You become light headed or faint.  · You notice sudden or gradual weakness on one side of the body or an arm or leg, or are unable to walk.  · You have a sudden, severe headache, blurred vision or high fever.  · You develop chest pain, nausea or vomiting.  If you have any of the above symptoms, DO NOT DRIVE. Have someone else drive you or call your local emergency services (911 in [place] for help.  Document Released: 01/25/2006 Document Revised: 03/11/2013 Document Reviewed: 03/15/2011  [employer]® Patient Information ©2014 [employer], [employer].

## 2017-10-30 NOTE — DISCHARGE PLANNING
Phone call to QASIM De La Torre [phone] to come to ER to assess HPN patient. Left message to contact ER SW.

## 2017-10-30 NOTE — PSYCHIATRY
PSYCHIATRIC CONSULTATION:seen. Full note to follow. Legal hold dc'd. risperdol 2 mg hs hs #7, NR. Referrals.

## 2017-10-30 NOTE — ED PROVIDER NOTES
"ED Provider Note Addendum    Scribed for Jacky Dinero M.D. by Hector Tomas on 10/30/2017 at 10:50 AM.     This is an addendum to the note on Ulises Matthews.  For further details and full chart information, see patient's initial note.       6:50 AM - I discussed the patient's case with Dr. Melgar ([facility]) who will transfer care of the patient to me at this time.        10:50 AM  - Patient evaluated by myself.  Patient is resting comfortably at this time with no complaints. I have asked the patient if he is able to discharge and he has stated \"no, because they are out there to kill me.\" I discussed his discharge instructions and follow up resources that were collected for him by social work. I asked the patient if her would like additional resources for obtain detox help for methamphetamine abuse. He is agreeable to this information.     10:55 AM - I discussed the patient's case and the above findings with [facility] who will continue to help in discharging the patient.     The patient will return for new or worsening symptoms and is stable at the time of discharge.    Legal hold the discontinued by Dr. Freeman psychiatry.    This patient presents with depression suicidal ideation and paranoid delusions likely related to his methamphetamine abuse. Limited antipsychotic medication prescription given by Dr. Freeman    DISPOSITION:  Patient will be discharged home in stable condition.    FOLLOW UP:  Patient given referral by [facility] for psychiatry and methamphetamine detox      OUTPATIENT MEDICATIONS:  New Prescriptions    No medications on file         FINAL IMPRESSION  1. Suicidal ideation    2. Acute psychosis    3. Methamphetamine abuse         Hector NOEL (Miranda), am scribing for, and in the presence of, Jacky Dinero M.D..    Electronically signed by: Hector Tomas (Miranda), 10/30/2017    Jacky NOEL M.D. personally performed the services described in this documentation, as scribed by " Hector Tomas in my presence, and it is both accurate and complete.    The note accurately reflects work and decisions made by me.  Jacky Dinero  10/30/2017  3:25 PM

## 2017-10-30 NOTE — ED NOTES
Pt ambulates to restroom, in no obvious distress, polite and cooperative to staff. I introduced myself at this time.

## 2017-10-30 NOTE — ED NOTES
Pt lying right side, eyes closed. No distress noted. Pt remains in unobstructed view from RN station.

## 2017-10-31 NOTE — DISCHARGE PLANNING
" This 52 y male presented in the er from [facility], after it was reported by staff there, he made some suicidal statements. He was in the process of being admitted for meth detox but became frustrated with the 's repetitive questions and may of said things that he claims where\"misunderstood\". He does appear to suffer from some intellectual impairment issues. He presently denies any si or plans to harm himself. He denies any hx of assualt or any hi or plan to harm others. He denies any psychosis. He was given op referrals and a safety plan. [facility]( via [facility] cab) and [facility] staff state they will reevaluate him in the am for detox tx. He is alert and oriented.( triage note).  "

## 2017-10-31 NOTE — ED NOTES
.Pt discharged in stable condition, ambulatory to waiting room in stable condition with all belongings with pt, given written and verbal dc instructions no questions voiced  Going back to [facility] via taxi

## 2017-10-31 NOTE — DISCHARGE PLANNING
S) terrible  O) patient discharged from hold by psych MD, patient offered to treat paranoia he reports at [facility] for few days.  Patient agreed  A) well know to [facility] amphetamine use  P) given bus pass will met with staff at [facility].

## 2017-10-31 NOTE — ED NOTES
".  Chief Complaint   Patient presents with   • Suicidal Ideation     pt was discharged from [facility] this am and sent to [facility] they said he voiced thoughts of suicide and sent him back to [facility] for this complaint, pt states \"I wasn't suicidal until now and now I am really suicidal after all of this\"       "

## 2017-10-31 NOTE — ED PROVIDER NOTES
"ED Provider Note    Scribed for Epi Palafox M.D. by Sarbjit Gee. 10/30/2017, 8:45 PM.    Primary care provider: Pcp Pt States None  Means of arrival: Walk-In  History obtained from: Patient  History limited by: None    CHIEF COMPLAINT  Chief Complaint   Patient presents with   • Suicidal Ideation     pt was discharged from [facility] this am and sent to [facility] they said he voiced thoughts of suicide and sent him back to [facility] for this complaint, pt states \"I wasn't suicidal until now and now I am really suicidal after all of this\"     HPI  Ulises Matthews is a 52 y.o. male who presents to the Emergency Department due to suicidal ideations onset today. The patient went to [facility] to try and receive treatment for detox from methamphetamines. However, the patient started to get into a verbal altercation with a  or a nurse due too many repetitive questions. Then, the story is that he said he would've hurt himself and was sent here for further evaluation of these ideations. The patient agrees that in the past he has had suicidal ideations when he was depressed or had low points but does not currently want to hurt himself. He also does not have any somatic complaints at this time. His last drink was on Saturday, 2 days ago. The patient has Medicaid.    REVIEW OF SYSTEMS  Positive drug abuse. Patient denies fever, vision change, sore throat, chest pain, shortness of breath, nausea, dysuria, focal muscle pain, rashes, suicidal ideation, homicidal ideation.    C.    PAST MEDICAL HISTORY   has a past medical history of Psychiatric disorder and Suicidal behavior.    SURGICAL HISTORY   has a past surgical history that includes other abdominal surgery and inguinal hernia repair robotic (Left, 7/13/2017).    SOCIAL HISTORY  Social History   Substance Use Topics   • Smoking status: Current Every Day Smoker     Packs/day: 0.50     Years: 22.00     Types: Cigarettes   • Smokeless tobacco: " "Never Used   • Alcohol use Yes      Comment: \"Couple of beers\" - daily      History   Drug Use   • Types: Intravenous, Inhaled, Injected (Skin Popping), Oral     Comment: Meth, cocaine, marijuana x3/wk.  7/12/2017 - \"No more\" last used drugs \"a month ago\".     FAMILY HISTORY  History reviewed. No pertinent family history.    CURRENT MEDICATIONS  Reviewed.  See Encounter Summary.     ALLERGIES  No Known Allergies    PHYSICAL EXAM  VITAL SIGNS: /76   Pulse 70   Temp 36.2 °C (97.2 °F)   Resp 16   Wt 90.3 kg (199 lb)   BMI 25.55 kg/m²    Constitutional: Alert in no apparent distress.  HENT: Head normocephalic, atraumatic, Bilateral external ears normal, Nose normal.  Eyes: Pupils are equal, extraocular movements intact, Conjunctiva normal, Non-icteric.   Cardiovascular: Regular rate and rhythm   Thorax & Lungs: No acute respiratory distress, No wheezing, No increased work of breathing.   Abdomen: Soft, Non tender, Non-distended, No pulsatile masses, No peritoneal signs.  Skin: Warm, Dry, No erythema, No rash.   Extremities: Intact distal pulses, No edema, No tenderness, No cyanosis.    Musculoskeletal: Good range of motion in all major joints. No tenderness to palpation or major deformities noted.   Neurologic: Alert , Normal motor function, Normal sensory function, No focal deficits noted.   Psychiatric: Denies SI, HI, or any acute psychosis. Wants to get treated for meth use. Normal affect. Appropriate judgement.    COURSE & MEDICAL DECISION MAKING  Pertinent Labs & Imaging studies reviewed. (See chart for details)    8:45 PM - Patient seen and examined at bedside. He will be further evaluated by the psychiatric nurse.    8:54 PM - The psychiatrist nurse informed me that [facility] has room for the patient and he will be transferred for further treatment.    Decision Making:  This is a 52 y.o. year old male who presents withMethamphetamine abuse, and wanting detoxification. Here the patient is denying any " intent to harm himself, harm anyone else, and is not having active psychosis. Given this he will not be placed on a legal hold, but was set up with evaluation at [facility] for possible inpatient treatment. He'll be transferred there.     The patient will return for new or worsening symptoms and is stable at the time of discharge.    DISPOSITION:  Patient will be discharged for transfer to [facility] in stable condition.    FOLLOW UP:  Pcp Pt States None    Schedule an appointment as soon as possible for a visit      FINAL IMPRESSION  1. Methamphetamine abuse       Sarbjit NOEL (Scribe), am scribing for, and in the presence of, Epi Palafox M.D..    Electronically signed by: Sarbjit Gee (Jacintoibe), 10/30/2017    Epi NOEL M.D. personally performed the services described in this documentation, as scribed by Sarbjit Gee in my presence, and it is both accurate and complete.    The note accurately reflects work and decisions made by me.  Epi Palafox  10/30/2017  9:47 PM

## 2017-10-31 NOTE — DISCHARGE PLANNING
[facility]  Crisis/Safety Plan    Name:  Ulises Matthews  MRN:  [id number]  Date:  10/30/2017    Warning signs that a crisis may be developing for me or I may be at risk:  1) feeling overwhelmed  2) feeling more depressed and anxious  3)thoughts of hurting yourself    Coping strategies I can use on my own (relaxation, physical activity, etc):  1) try to exercise every day  2) daily aa or na meetings   3) listen to easy music    Ways I can make my environment safe:  1) no weapons in your living space  2)stay away from drugs  3)secure any sharps    Things I want to tell myself when I feel a crisis developin) try to stay calm  2)remember there is help out there  3)get help before a crisis develops    People I can contact for support or distraction (and their phone numbers):  1) Maru [phone]  2) use numbers below      If I’m not able to reach my support people, or the above strategies don’t help, I can contact the following professionals, agencies, or hotlines:  1) Crisis Call Center ():  [phone] -OR- [phone]  2) Crisis Text Line ():  Text START to 117779  3)   4)     Jacky Forman R.N.

## 2017-10-31 NOTE — DISCHARGE INSTRUCTIONS
"Stimulant Use Disorder-Amphetamines  Amphetamines are one of a group of powerful drugs known as stimulants. Amphetamines have a number of medical uses, including the treatment of a daytime sleepiness disorder due to narcolepsy or sleep apnea, attention deficit hyperactivity disorder, and chronic fatigue syndrome. However, amphetamines also are often misused because of the effects they produce. These effects include:  · A feeling of extreme pleasure (euphoria).  · Alertness.  · Increased attention.  · High energy.  · Loss of appetite for weight loss.  Common street names for these drugs include speed and crank. Amphetamines are taken by mouth, crushed and snorted, or dissolved in water and injected.  Stimulants are addictive because they activate regions of the brain that are responsible for producing both the pleasurable sensation of \"reward\" and psychological dependence. Together, these actions account for loss of control and the rapid development of drug dependence. This means you will become ill without the drug (withdrawal) and need to keep using it to function.   Stimulant use disorder is use of stimulants that disrupts your daily life. It disrupts relationships with family and friends and how you do your job. Amphetamines increase blood pressure and heart rate. Use can lead to heart attack or stroke. Use can also cause death from irregular heart rate, seizures, or dangerously high body temperature.  SIGNS AND SYMPTOMS   Symptoms of stimulant use disorder with amphetamines include:  · Use of amphetamines in larger amounts or over a longer period than intended.  · Unsuccessful attempts to cut down or control amphetamine use.  · A lot of time spent obtaining, using, or recovering from the effects of amphetamines.  · A strong desire or urge to use amphetamines (craving).  · Continued use of amphetamines in spite of major problems at work, school, or home because of use.  · Continued use of amphetamines in spite of " relationship problems because of use.  · Giving up or cutting down on important life activities because of amphetamine use.  · Use of amphetamines over and over in situations when it is physically hazardous, such as driving a car.  · Continued use of amphetamines in spite of a physical problem that is likely related to amphetamine use. Physical problems can include:  ¨ Unintended weight loss.  ¨ High blood pressure.  ¨ Chest pain.  ¨ Infections such as human immunodeficiency virus and hepatitis (from injecting amphetamines).  · Continued use of amphetamines in spite of mental problems that are likely related to use. Mental problems can include:  ¨ Anxiety.  ¨ Sleep problems.  ¨ Schizophrenia-like symptoms.  ¨ Depression.  ¨ Bipolar mood swings.  ¨ Violent behavior.  · Need to use more and more amphetamines to get the same effect, or lessened effect over time with use of the same amount (tolerance).  · Having withdrawal symptoms when amphetamine use is stopped, or using amphetamines to reduce or avoid withdrawal symptoms. Withdrawal symptoms include:  ¨ Depressed mood.  ¨ Low energy or restlessness.  ¨ Bad dreams.  ¨ Too little or too much sleep.  ¨ Increased appetite.  DIAGNOSIS   Stimulant use disorder is diagnosed by your health care provider. You may be asked questions about your amphetamine use and how it affects your life. A physical exam may be done. A drug screen may be ordered. You may be referred to a mental health professional. The diagnosis of stimulant use disorder requires two or more symptoms within 12 months. The type of stimulant use disorder you have depends on the number of signs and symptoms you have. The type may be:  · Mild. Two or three signs and symptoms.  · Moderate. Four or five signs and symptoms.  · Severe. Six or more signs and symptoms.  TREATMENT   The treatment for most problems related to stimulant use disorder with amphetamines may be divided into two types:  · Short-term medical  treatment. This helps to preserve life and prevent or minimize damage from physical or mental problems related to use.  · Long-term substance abuse treatment. This focuses on recovery from use disorder. It is provided by mental health professionals who have training in substance use disorders. It is usually a combination of counseling, support groups, and nonaddictive medicines that can reduce cravings or block the effects of amphetamines.  HOME CARE INSTRUCTIONS   · Take medicines only as directed by your health care provider.  · Identify the people and activities that trigger your amphetamine use and avoid them.  · Keep all follow-up visits as directed by your health care provider.  SEEK MEDICAL CARE IF:  · Your symptoms get worse or you relapse.  · You are not able to take medicines as directed.  SEEK IMMEDIATE MEDICAL CARE IF:   · You have serious thoughts about hurting yourself or others.  · You have a seizure, chest pain, sudden weakness, or loss of speech or vision.  FOR MORE INFORMATION  · National Morrison on Drug Abuse: www.drugabuse.gov  · Substance Abuse and Mental Health Services Administration: www.samhsa.gov     This information is not intended to replace advice given to you by your health care provider. Make sure you discuss any questions you have with your health care provider.     Document Released: 12/12/2002 Document Revised: 01/08/2016 Document Reviewed: 12/31/2014  Elsevier Interactive Patient Education ©2016 [employer] Inc[employer]

## 2017-11-02 NOTE — PSYCHIATRY
"PSYCHIATRIC CONSULTATION:seen 10/30/2017  Reason for admission:pt was discharged from [facility] this am and sent to [facility] at [facility] they said he voiced thoughts of suicide and sent him back to [facility] for this complaint, pt states \"I wasn't suicidal until now and now I am really suicidal after all of this\" . The patient went to [facility] to try and receive treatment for detox from methamphetamines. However, the patient started to get into a verbal altercation with a  or a nurse due too many repetitive questions. Then, the story is that he said he would've hurt himself and was sent here for further evaluation of these ideations. The patient agrees that in the past he has had suicidal ideations when he was depressed or had low points but does not currently want to hurt himself.   Reason for consult: suicidal  Requesting Physician:Epi Palafox M.D.       Legal status:+      Chief Complaint: \"my mind isn't functioning\"    HPI: 51 yo male who has inconsistent memory issues. He says \"people are trying to kill me for no reason\" \"It was really bad before I got here\".  Hears \"voices coming from everywhere\". But can't decide if they also come from inside his head. He was \"tricked into using meth 2-3 times a week for the last month. Before that was clean 3-4 months. When asked about SAs he says \"Im trying right now\". He is not paranoid of any of the staff. He \"thinks\" he is in a hospital and has difficulty remembering the name, [facility] though he remembers the city and has been here for a few years. He remembered the year \"I think its 2017\" but thought it was November. Her remembers all sort of recent events such as he is homeless but has no idea how long he has been (not even if its been months or years), isn't sure if he has income or not, isn't sure if he has friends or not but does remember having 2 teeth pulled recently.   Tells me he \"thinks\" he graduated HS but doesn't know and can't guess how he did " "in school. He does know he has had a felony but it wasn't for violence.  He also knows he has been in [place] \"on and off since 1999\". He remembers being in a rehab before.     Psychiatric Review of Systems:current symptoms as reported by pt.  Depression:   Endorses everything asked. Does not look depressed.     Sonam: if not asked symptoms directly, doesn't know.  Anxiety/Panic Attacks endorses  PTSD symptom: he isn't sure  Psychosis:AH as noted.     Medical Review of Systems: as reported by pt. All systems reviewed. Only those found to be + are noted below. All others are negative.   Neurological:    TBIs:had at least 3-4 but doesn't have any idea if there was a LOC or not. Never in the ICU that he knows.   SZs:\"probably\"   Strokes:denies  Other medical symptoms:doesn't \"think so\"     Psychiatric Examination: observed phenomenon:  Vitals:Blood pressure 123/76, pulse 70, temperature 36.2 °C (97.2 °F), resp. rate 16, weight 90.3 kg (199 lb).  Musculoskeletal(abnormal movements, gait, etc):none  Appearance:shaved head, mustache that is well defined (ie he takes care of his appearance), normal habitus, avoids eye contact.   Thoughts: vague, linear, and if he is psychotic, it doesn't interfere with his ability to ask for food, to ask for help, and to answer questions as he does.   Speech:wnl  Mood: depressed   Affect:euthymic  SI/HI: +SI, negative HI  Attention/Alertness:  intact  Memory: strongly suspect it is intact. Couldn't/wouldn't participate in testing.  Orientation: x everything with effort except the month   Fund of Knowledge: not tested  Insight/Judgement into symptoms:suspect he knows what he is doing. If taken at face value, then he has very poor insight and judgment.      Past Psychiatric Hx: has been hospitalized 2-3 times. Can't remember where, when, for what......    Family Psychiatric Hx:\"everybody\" uses drugs. No hospitalizations or SAs.    Social Hx:as noted. [facility] knows him very well. "     Drug/Alcohol/Tobacco Hx:as noted above. When pressed he avoids answering.   Drugs:meth as noted.   Alcohol:uses    Medical Hx: labs, MARS, medications, etc were reviewed. Only those findings of potential interest to psychiatry are noted below:  Medical Conditions:   None acutely  Allergies: Review of patient's allergies indicates no known allergies.  Medications (currently prescribed at [facility]):none  Labs:UDS + amphetamines. Alcohol negative.  ECG: none     ASSESSMENT: (new dx, acuity level)  Amphetamine Use Disorder  R/O amphetamine induced psychosis  Malingering for comfortable environment.    PLAN:will give a script for a 7 days dose of risperdol: giving the patient the benefit of the doubt for his paranoia and AH as he requested. NR. Will be going to [facility].   Legal status: cd'd   Signing off   Thank you for the consult.

## 2017-12-12 ENCOUNTER — NON-PROVIDER VISIT (OUTPATIENT)
Dept: OCCUPATIONAL MEDICINE | Facility: CLINIC | Age: 52
End: 2017-12-12

## 2017-12-12 DIAGNOSIS — Z02.1 ENCOUNTER FOR PRE-EMPLOYMENT DRUG TESTING: ICD-10-CM

## 2017-12-12 DIAGNOSIS — Z02.1 PRE-EMPLOYMENT DRUG SCREENING: ICD-10-CM

## 2017-12-12 LAB
AMP AMPHETAMINE: NORMAL
COC COCAINE: NORMAL
INT CON NEG: NORMAL
INT CON POS: NORMAL
MET METHAMPHETAMINES: NORMAL
OPI OPIATES: NORMAL
PCP PHENCYCLIDINE: NORMAL
POC DRUG COMMENT 753798-OCCUPATIONAL HEALTH: NORMAL
THC: NORMAL

## 2017-12-12 PROCEDURE — 80305 DRUG TEST PRSMV DIR OPT OBS: CPT | Performed by: PREVENTIVE MEDICINE

## 2017-12-26 ENCOUNTER — HOSPITAL ENCOUNTER (EMERGENCY)
Facility: MEDICAL CENTER | Age: 52
End: 2017-12-26
Attending: EMERGENCY MEDICINE
Payer: MEDICAID

## 2017-12-26 VITALS
TEMPERATURE: 100.1 F | HEART RATE: 88 BPM | BODY MASS INDEX: 25.73 KG/M2 | RESPIRATION RATE: 16 BRPM | OXYGEN SATURATION: 97 % | DIASTOLIC BLOOD PRESSURE: 76 MMHG | SYSTOLIC BLOOD PRESSURE: 118 MMHG | WEIGHT: 190 LBS | HEIGHT: 72 IN

## 2017-12-26 DIAGNOSIS — J11.1 INFLUENZA: ICD-10-CM

## 2017-12-26 PROCEDURE — 99284 EMERGENCY DEPT VISIT MOD MDM: CPT

## 2017-12-26 PROCEDURE — A9270 NON-COVERED ITEM OR SERVICE: HCPCS | Performed by: EMERGENCY MEDICINE

## 2017-12-26 PROCEDURE — 700102 HCHG RX REV CODE 250 W/ 637 OVERRIDE(OP): Performed by: EMERGENCY MEDICINE

## 2017-12-26 RX ORDER — OSELTAMIVIR PHOSPHATE 75 MG/1
75 CAPSULE ORAL 2 TIMES DAILY
Qty: 10 CAP | Refills: 0 | Status: SHIPPED | OUTPATIENT
Start: 2017-12-26

## 2017-12-26 RX ORDER — OSELTAMIVIR PHOSPHATE 75 MG/1
75 CAPSULE ORAL ONCE
Status: COMPLETED | OUTPATIENT
Start: 2017-12-26 | End: 2017-12-26

## 2017-12-26 RX ADMIN — OSELTAMIVIR PHOSPHATE 75 MG: 75 CAPSULE ORAL at 18:11

## 2017-12-26 ASSESSMENT — PAIN SCALES - GENERAL: PAINLEVEL_OUTOF10: 8

## 2017-12-27 NOTE — DISCHARGE INSTRUCTIONS
Influenza, Adult  Influenza (flu) is an infection in the mouth, nose, and throat (respiratory tract) caused by a virus. The flu can make you feel very ill. Influenza spreads easily from person to person (contagious).   HOME CARE   · Only take medicines as told by your doctor.  · Use a cool mist humidifier to make breathing easier.  · Get plenty of rest until your fever goes away. This usually takes 3 to 4 days.  · Drink enough fluids to keep your pee (urine) clear or pale yellow.  · Cover your mouth and nose when you cough or sneeze.  · Wash your hands well to avoid spreading the flu.  · Stay home from work or school until your fever has been gone for at least 1 full day.  · Get a flu shot every year.  GET HELP RIGHT AWAY IF:   · You have trouble breathing or feel short of breath.  · Your skin or nails turn blue.  · You have severe neck pain or stiffness.  · You have a severe headache, facial pain, or earache.  · Your fever gets worse or keeps coming back.  · You feel sick to your stomach (nauseous), throw up (vomit), or have watery poop (diarrhea).  · You have chest pain.  · You have a deep cough that gets worse, or you cough up more thick spit (mucus).  MAKE SURE YOU:   · Understand these instructions.  · Will watch your condition.  · Will get help right away if you are not doing well or get worse.     This information is not intended to replace advice given to you by your health care provider. Make sure you discuss any questions you have with your health care provider.     Document Released: 09/26/2009 Document Revised: 01/08/2016 Document Reviewed: 03/18/2013  [employer] Interactive Patient Education ©2016 [employer]

## 2018-10-15 NOTE — ED NOTES
CARDIOVASCULAR - ADULT    • Maintains optimal cardiac output and hemodynamic stability Progressing    • Absence of cardiac arrhythmias or at baseline Progressing        GASTROINTESTINAL - ADULT    • Minimal or absence of nausea and vomiting Progressing Pt resting, breathing unlabored, will continue to monitor.

## 2019-11-29 NOTE — ED NOTES
Visible rise and fall of chest. No distress noted.    - likely hepatorenal syndrome; renal input appreciated  - titrate lactulose for 3-4 BM/day; please give Lactulose Enemas if pt unable to tolerate PO intake vs NGT placement for Lactulose  - continue xifaxan bid   - ID/Hepatology/Renal input noted and appreciated; cont to follow recs

## 2020-11-06 ENCOUNTER — HOSPITAL ENCOUNTER (EMERGENCY)
Dept: HOSPITAL 8 - ED | Age: 55
Discharge: LEFT BEFORE DISCHARGE | End: 2020-11-06
Payer: MEDICAID

## 2020-11-06 VITALS — WEIGHT: 179.02 LBS | BODY MASS INDEX: 21.14 KG/M2 | HEIGHT: 77 IN

## 2020-11-06 VITALS — SYSTOLIC BLOOD PRESSURE: 149 MMHG | DIASTOLIC BLOOD PRESSURE: 92 MMHG

## 2020-11-06 DIAGNOSIS — R22.0: ICD-10-CM

## 2020-11-06 DIAGNOSIS — K04.7: Primary | ICD-10-CM

## 2020-11-06 PROCEDURE — 99281 EMR DPT VST MAYX REQ PHY/QHP: CPT

## 2020-11-06 NOTE — NUR
CHARGE RN: CALLED FOR ROOM, NO ANSWER
[Definite ___ (Date)] : the last menstrual period was [unfilled]

## 2021-01-31 ENCOUNTER — HOSPITAL ENCOUNTER (EMERGENCY)
Facility: MEDICAL CENTER | Age: 56
End: 2021-01-31
Attending: EMERGENCY MEDICINE
Payer: MEDICAID

## 2021-01-31 VITALS
SYSTOLIC BLOOD PRESSURE: 125 MMHG | RESPIRATION RATE: 18 BRPM | WEIGHT: 184.97 LBS | BODY MASS INDEX: 25.05 KG/M2 | HEART RATE: 99 BPM | HEIGHT: 72 IN | DIASTOLIC BLOOD PRESSURE: 75 MMHG | TEMPERATURE: 97.8 F | OXYGEN SATURATION: 99 %

## 2021-01-31 DIAGNOSIS — A64 STI (SEXUALLY TRANSMITTED INFECTION): ICD-10-CM

## 2021-01-31 DIAGNOSIS — N39.0 ACUTE UTI: ICD-10-CM

## 2021-01-31 LAB
APPEARANCE UR: CLEAR
BACTERIA #/AREA URNS HPF: NEGATIVE /HPF
BILIRUB UR QL STRIP.AUTO: NEGATIVE
COLOR UR: YELLOW
EPI CELLS #/AREA URNS HPF: NEGATIVE /HPF
GLUCOSE UR STRIP.AUTO-MCNC: NEGATIVE MG/DL
HYALINE CASTS #/AREA URNS LPF: ABNORMAL /LPF
KETONES UR STRIP.AUTO-MCNC: NEGATIVE MG/DL
LEUKOCYTE ESTERASE UR QL STRIP.AUTO: ABNORMAL
MICRO URNS: ABNORMAL
NITRITE UR QL STRIP.AUTO: NEGATIVE
PH UR STRIP.AUTO: 6 [PH] (ref 5–8)
PROT UR QL STRIP: NEGATIVE MG/DL
RBC # URNS HPF: ABNORMAL /HPF
RBC UR QL AUTO: NEGATIVE
SP GR UR STRIP.AUTO: 1.02
UROBILINOGEN UR STRIP.AUTO-MCNC: 1 MG/DL
WBC #/AREA URNS HPF: ABNORMAL /HPF

## 2021-01-31 PROCEDURE — 96372 THER/PROPH/DIAG INJ SC/IM: CPT

## 2021-01-31 PROCEDURE — 99284 EMERGENCY DEPT VISIT MOD MDM: CPT

## 2021-01-31 PROCEDURE — 87491 CHLMYD TRACH DNA AMP PROBE: CPT

## 2021-01-31 PROCEDURE — 700111 HCHG RX REV CODE 636 W/ 250 OVERRIDE (IP): Performed by: EMERGENCY MEDICINE

## 2021-01-31 PROCEDURE — 87591 N.GONORRHOEAE DNA AMP PROB: CPT

## 2021-01-31 PROCEDURE — 87086 URINE CULTURE/COLONY COUNT: CPT

## 2021-01-31 PROCEDURE — 700102 HCHG RX REV CODE 250 W/ 637 OVERRIDE(OP): Performed by: EMERGENCY MEDICINE

## 2021-01-31 PROCEDURE — 81001 URINALYSIS AUTO W/SCOPE: CPT

## 2021-01-31 PROCEDURE — 700101 HCHG RX REV CODE 250: Performed by: EMERGENCY MEDICINE

## 2021-01-31 PROCEDURE — A9270 NON-COVERED ITEM OR SERVICE: HCPCS | Performed by: EMERGENCY MEDICINE

## 2021-01-31 RX ORDER — LIDOCAINE HYDROCHLORIDE 10 MG/ML
INJECTION, SOLUTION INFILTRATION; PERINEURAL
Status: DISCONTINUED
Start: 2021-01-31 | End: 2021-01-31 | Stop reason: HOSPADM

## 2021-01-31 RX ORDER — AZITHROMYCIN 250 MG/1
1000 TABLET, FILM COATED ORAL ONCE
Status: COMPLETED | OUTPATIENT
Start: 2021-01-31 | End: 2021-01-31

## 2021-01-31 RX ORDER — CEFTRIAXONE 500 MG/1
500 INJECTION, POWDER, FOR SOLUTION INTRAMUSCULAR; INTRAVENOUS ONCE
Status: COMPLETED | OUTPATIENT
Start: 2021-01-31 | End: 2021-01-31

## 2021-01-31 RX ORDER — NITROFURANTOIN 25; 75 MG/1; MG/1
100 CAPSULE ORAL 2 TIMES DAILY
Qty: 20 CAP | Refills: 0 | Status: SHIPPED | OUTPATIENT
Start: 2021-01-31

## 2021-01-31 RX ORDER — ONDANSETRON 4 MG/1
4 TABLET, ORALLY DISINTEGRATING ORAL ONCE
Status: COMPLETED | OUTPATIENT
Start: 2021-01-31 | End: 2021-01-31

## 2021-01-31 RX ADMIN — AZITHROMYCIN MONOHYDRATE 1000 MG: 250 TABLET ORAL at 15:59

## 2021-01-31 RX ADMIN — ONDANSETRON 4 MG: 4 TABLET, ORALLY DISINTEGRATING ORAL at 15:59

## 2021-01-31 RX ADMIN — CEFTRIAXONE SODIUM 500 MG: 500 INJECTION, POWDER, FOR SOLUTION INTRAMUSCULAR; INTRAVENOUS at 16:10

## 2021-01-31 RX ADMIN — LIDOCAINE HYDROCHLORIDE 1 ML: 10 INJECTION, SOLUTION INFILTRATION; PERINEURAL at 16:10

## 2021-01-31 ASSESSMENT — LIFESTYLE VARIABLES: DO YOU DRINK ALCOHOL: NO

## 2021-01-31 NOTE — ED TRIAGE NOTES
Ulises Aamir [facility]  Chief Complaint   Patient presents with   • Exposure to STD     Pt complains of penile d/c x 1 month, Pt endorses painful urination.      Pt ambulatory to triage with above complaint.     /76   Pulse 100   Temp 36.2 °C (97.1 °F) (Temporal)   Resp 18   Ht 1.829 m (6')   Wt 83.9 kg (184 lb 15.5 oz)   SpO2 98%   BMI 25.09 kg/m²     Pt informed of triage process and encouraged to notify staff of any changes or concerns. Pt verbalized understanding of instructions. Apologized for long wait time. Pt placed back in lobby.

## 2021-01-31 NOTE — LETTER
2/2/2021               Ulises Matthews  [facility] [address]        Dear Ulises (MR#[id number])    As we have been unable to contact you by phone, this letter is sent in regards to your, recent visit to the [facility] Emergency Department on 1/31/2021.  During the visit, tests were performed to assist the physician in a medical diagnosis.  A review of those tests requires that we notify you of the following:    Your culture test was positive for Gonorrhea, a sexually transmitted infection. This was already treated appropriately in the Emergency Department. .       Based on the above findings it is recommended that you seek testing for the presence of additional sexually transmitted infections from the Health Department. Also, it is advised that you inform your sexual partner(s) within the previous 60 days of the above findings and direct them to the Health Department for testing. Should your symptoms progress, it is important that you follow up with your primary care physician, your local urgent care office, or return to the emergency department for further work up in order to prevent long term health issues.      Thank you for your cooperation in the matter.    Sincerely,  ED Culture Follow-Up Staff  Vy Vazquez, PharmD    [facility], Emergency Department  [address], [place] [address]  [phone] (ED Culture Line)  [phone]

## 2021-01-31 NOTE — ED PROVIDER NOTES
"ED Provider Note    CHIEF COMPLAINT  Chief Complaint   Patient presents with   • Exposure to STD     Pt complains of penile d/c x 1 month, Pt endorses painful urination.        HPI  Ulises Matthews is a 55 y.o. male here for evaluation of penile discharge.  Patient states that he has had this discharge for about 1 month.  He does have unprotected sexual intercourse, and thinks he knows who \"gave it to me.\"  Patient has no fever chills or vomiting, he has no abdominal pain.  Patient states came in today he is having some burning with urination.  Patient has no fever chills or vomiting.  He has no back pain, no chest pain, no shortness of breath.    ROS;  Please see HPI  O/W negative     PAST MEDICAL HISTORY   has a past medical history of Psychiatric disorder and Suicidal behavior.    SOCIAL HISTORY  Social History     Tobacco Use   • Smoking status: Current Every Day Smoker     Packs/day: 0.50     Years: 22.00     Pack years: 11.00     Types: Cigarettes   • Smokeless tobacco: Never Used   Substance and Sexual Activity   • Alcohol use: Yes     Comment: \"Couple of beers\" - daily   • Drug use: Yes     Types: Inhaled     Comment:  marijuana x3/wk.    • Sexual activity: Not on file       SURGICAL HISTORY   has a past surgical history that includes other abdominal surgery and inguinal hernia repair robotic (Left, 7/13/2017).    CURRENT MEDICATIONS  Home Medications    **Home medications have not yet been reviewed for this encounter**         ALLERGIES  No Known Allergies    REVIEW OF SYSTEMS  See HPI for further details. Review of systems as above, otherwise all other systems are negative.     PHYSICAL EXAM  VITAL SIGNS: /76   Pulse 100   Temp 36.2 °C (97.1 °F) (Temporal)   Resp 18   Ht 1.829 m (6')   Wt 83.9 kg (184 lb 15.5 oz)   SpO2 98%   BMI 25.09 kg/m²     Constitutional: Well developed, well nourished. No acute distress.  HEENT: Normocephalic, atraumatic. MMM  Neck: Supple, Full range of motion "   Chest/Pulmonary:  No respiratory distress.  Equal expansion   ; yellow discharge from the urethra meatus.  No vesicles noted.  Musculoskeletal: No deformity, no edema, neurovascular intact.   Neuro: Clear speech, appropriate, cooperative, cranial nerves II-XII grossly intact.  Psych: Normal mood and affect    Results for orders placed or performed during the hospital encounter of 01/31/21   URINALYSIS,CULTURE IF INDICATED    Specimen: Urine   Result Value Ref Range    Color Yellow     Character Clear     Specific Gravity 1.016 <1.035    Ph 6.0 5.0 - 8.0    Glucose Negative Negative mg/dL    Ketones Negative Negative mg/dL    Protein Negative Negative mg/dL    Bilirubin Negative Negative    Urobilinogen, Urine 1.0 Negative    Nitrite Negative Negative    Leukocyte Esterase Moderate (A) Negative    Occult Blood Negative Negative    Micro Urine Req Microscopic    Chlamydia/GC PCR Urine Or Swab    Specimen: Urine, First Catch   Result Value Ref Range    Source Urine    URINE MICROSCOPIC (W/UA)   Result Value Ref Range    WBC Packed (A) /hpf    RBC 0-2 (A) /hpf    Bacteria Negative None /hpf    Epithelial Cells Negative /hpf    Hyaline Cast 0-2 /lpf   URINE CULTURE(NEW)    Specimen: Urine   Result Value Ref Range    Significant Indicator NEG     Source UR     Site -     Culture Result -        PROCEDURES     MEDICAL RECORD  I have reviewed patient's medical record and pertinent results are listed.    COURSE & MEDICAL DECISION MAKING  I have reviewed any medical record information, laboratory studies and radiographic results as noted above.    3:28 PM  I spoke to Anthony on for pharmacy, he would like to do Rocephin 500, and will do the Zithromax one-time dose.  Patient is likely to be noncompliant, so we will try to do everything here.  He is non toxic appearing, afebrile.     I you have had any blood pressure issues while here in the emergency department, please see your doctor for a further evaluation or work  up.    Differential diagnoses include but not limited to: urethritis, uti.    This patient presents with urethritis and uti .  At this time, I have counseled the patient/family regarding their medications, pain control, and follow up.  They will continue their medications, if any, as prescribed.  They will return immediately for any worsening symptoms and/or any other medical concerns.  They will see their doctor, or contact the doctor provided, in 1-2 days for follow up.       FINAL IMPRESSION  Urethritis   uti      Electronically signed by: Xavier Harrison D.O., 1/31/2021 3:27 PM

## 2021-01-31 NOTE — LETTER
2/2/2021               Ulises Matthews  [address] [place] [id number]        Dear Ulises (MR#[id number])    This letter is sent in regards to your, recent visit to the [facility] Emergency Department on 1/31/2021.  During the visit, tests were performed to assist the physician in a medical diagnosis.  A review of those tests requires that we notify you of the following:    Your urine culture was NEGATIVE for bacterial growth. The antibiotic prescribed for you (nitrofurantoin) is not necessary for you to continue and can be safely stopped.      Please feel free to contact me at the number below if you have any questions or concerns. Thank you for your cooperation in the matter.    Sincerely,  ED Culture Follow-Up Staff  Vy Vazquez, PharmD    [facility], Emergency Department  [address] [place] [id number]  [phone] (ED Culture Line)  [phone]                   [address] [facility], [address]          October 18, 2021        [address]  [address] [place] [address]      Dear Denny Vera:     We have attempted to contact you by phone with corina

## 2021-02-01 LAB
C TRACH DNA SPEC QL NAA+PROBE: NEGATIVE
N GONORRHOEA DNA SPEC QL NAA+PROBE: POSITIVE
SPECIMEN SOURCE: ABNORMAL

## 2021-02-02 LAB
BACTERIA UR CULT: NORMAL
SIGNIFICANT IND 70042: NORMAL
SITE SITE: NORMAL
SOURCE SOURCE: NORMAL

## 2021-02-02 NOTE — ED NOTES
ED Positive Culture Follow-up/Notification Note:    Date: 2/2/21     Patient seen in the ED on 1/31/2021 for exposure to a STI. Patient received Ceftriaxone 500mg IM x 1 and Azithromycin 1000mg PO x 1 in the ED.       1. STI (sexually transmitted infection)    2. Acute UTI       Discharge Medication List as of 1/31/2021  4:55 PM      START taking these medications    Details   nitrofurantoin (MACROBID) 100 MG Cap Take 1 Cap by mouth 2 times a day., Disp-20 Cap, R-0, Normal             Allergies: Patient has no known allergies.     Vitals:    01/31/21 1352 01/31/21 1354 01/31/21 1706   BP: 128/76  125/75   Pulse: 100  99   Resp: 18  18   Temp: 36.2 °C (97.1 °F)  36.6 °C (97.8 °F)   TempSrc: Temporal  Temporal   SpO2: 98%  99%   Weight:  83.9 kg (184 lb 15.5 oz)    Height:  1.829 m (6')        Final cultures:   Results     Procedure Component Value Units Date/Time    Chlamydia/GC PCR Urine Or Swab [[id number]]  (Abnormal) Collected: 01/31/21 1510    Order Status: Completed Specimen: Urine, First Catch Updated: 02/01/21 2019     C. trachomatis by PCR Negative     N. gonorrhoeae by PCR POSITIVE     Source Urine    Narrative:      Indication for culture:->Patient WITHOUT an indwelling Stout  catheter in place with new onset of Dysuria, Frequency,  Urgency, and/or Suprapubic pain    URINE CULTURE(NEW) [[id number]] Collected: 01/31/21 1510    Order Status: Completed Specimen: Urine Updated: 02/01/21 1520     Significant Indicator NEG     Source UR     Site -     Culture Result No growth at 24 hours.    Narrative:      Indication for culture:->Patient WITHOUT an indwelling Stout  catheter in place with new onset of Dysuria, Frequency,  Urgency, and/or Suprapubic pain    CHLAMYDIA & GC BY PCR [[id number]] Collected: 01/31/21 1649    Order Status: Completed Specimen: Genital Updated: 01/31/21 2305     Source Other    Chlamydia/GC PCR Urine Or Swab [[id number]]     Order Status: Canceled Specimen: Urine, First Catch     WET PREP  [[id number]]     Order Status: Canceled Specimen: Genital from Vaginal     URINALYSIS,CULTURE IF INDICATED [[id number]]  (Abnormal) Collected: 01/31/21 1510    Order Status: Completed Specimen: Urine Updated: 01/31/21 1619     Color Yellow     Character Clear     Specific Gravity 1.016     Ph 6.0     Glucose Negative mg/dL      Ketones Negative mg/dL      Protein Negative mg/dL      Bilirubin Negative     Urobilinogen, Urine 1.0     Nitrite Negative     Leukocyte Esterase Moderate     Occult Blood Negative     Micro Urine Req Microscopic    Narrative:      Indication for culture:->Patient WITHOUT an indwelling Stout  catheter in place with new onset of Dysuria, Frequency,  Urgency, and/or Suprapubic pain          Plan:   Patient treated for gonorrhea in the ER. No additional treatment necessary. Unable to reach patient via telephone since the number listed is the homeless shelter. Will attempt to send a letter to address on file to notify the patient to abstain from sexual intercourse 7 days after antibiotic therapy is completed, to notify any sexual partners within the last 60 days to go the the [facility] Department for testing, to seek further STD/HIV testing, and to seek medical attention if symptoms persist.      Vy Vazquez, PharmD  T: [phone]    Addendum 1329  Patient's urine culture finalized as mixed daniel. Will attempt to send a letter to the address on file for the patient to stop taking the Macrobid. Symptoms where likely d/t his diagnosis of gonorrhea.

## 2021-02-14 NOTE — ED PROVIDER NOTES
"ED Provider Note    CHIEF COMPLAINT  Chief Complaint   Patient presents with   • Flu Like Symptoms       HPI  Ulises Matthews is a 52 y.o. male who presents complaining of cough, congestion. The patient started symptoms about a week ago. After a few days, he felt like he was getting better, not quite back to normal. However yesterday he started feeling much worse. In addition to his cough and congestion, he has had runny nose, body aches, hot and cold chills, just not feeling well. No belly pain. No change in bowel or bladder. Appetite diminished but is able eat and drink. He has had no other complaint.    PAST MEDICAL HISTORY  Past Medical History:   Diagnosis Date   • Psychiatric disorder     depression   • Suicidal behavior     hx of drug induced SI       FAMILY HISTORY  No family history on file.    SOCIAL HISTORY  Social History   Substance Use Topics   • Smoking status: Current Every Day Smoker     Packs/day: 0.50     Years: 22.00     Types: Cigarettes   • Smokeless tobacco: Never Used   • Alcohol use Yes      Comment: \"Couple of beers\" - daily         SURGICAL HISTORY  Past Surgical History:   Procedure Laterality Date   • INGUINAL HERNIA REPAIR ROBOTIC Left 7/13/2017    Procedure: INGUINAL HERNIA REPAIR ROBOTIC LT W/MESH;  Surgeon: Bernardo Hernandez M.D.;  Location: SURGERY [facility];  Service:    • OTHER ABDOMINAL SURGERY      hernia repair       CURRENT MEDICATIONS    I have reviewed the nurses notes and/or the list brought with the patient.    ALLERGIES  No Known Allergies    REVIEW OF SYSTEMS  See HPI for further details. Review of systems as above, otherwise all other systems are negative.     PHYSICAL EXAM  VITAL SIGNS: /74   Pulse 96   Temp 37.8 °C (100.1 °F)   Resp 18   Ht 1.829 m (6')   Wt 86.2 kg (190 lb)   SpO2 97%   BMI 25.77 kg/m²   Constitutional: Well appearing patient in no acute distress.  Not toxic, nor ill in appearance.Occasional dry cough. Mild " rhinorrhea.  HENT: Mucus membranes moist.  Oropharynx is clear.  Eyes: Pupils equally round.  No scleral icterus.   Neck: Full nontender range of motion. No meningismus.  Lymphatic: No cervical lymphadenopathy noted.   Cardiovascular: Regular heart rate and rhythm.  No murmurs, rubs, nor gallop appreciated.   Thorax & Lungs: Chest is nontender.  Lungs are clear to auscultation with good air movement bilaterally.  No wheeze, rhonchi, nor rales.   Abdomen: Soft, with no tenderness, rebound nor guarding.  No mass, pulsatile mass, nor hepatosplenomegaly appreciated.  Skin: No purpura nor petechia noted.  Extremities/Musculoskeletal: No sign of trauma.  Calves are nontender with no cords nor edema.  No Javier's sign.  Pulses are intact all around.   Neurologic: Alert & oriented.  Strength and sensation is intact all around.  Gait is normal.  Psychiatric: Normal affect appropriate for the clinical situation.      MEDICAL RECORD  I have reviewed patient's medical record and pertinent results are listed above.    COURSE & MEDICAL DECISION MAKING  I have reviewed any medical record information, laboratory studies and radiographic results as noted above.  This is a well-appearing patient does cough, cold symptoms, myalgias. Clinically, I suspect that he has influenza, given our incidence in the community immediately. I see no evidence of complications such as pneumonia, meningitis or dehydration at this time. His vital signs are normal, he is saturating well. Although this could be the same viral process for the last week, given the fact that he was almost better and then had recurrence of symptoms yesterday, I think he is a candidate for Tamiflu. I will start him on this empirically on Tamiflu. Generic Instructions of influenza. My usual influenza discussion precautions. Return to the ER for return for worse.    FINAL IMPRESSION  1. Influenza           This dictation was created using voice recognition  software.    Electronically signed by: Waldo Hamilton, 12/26/2017 5:52 PM     Home

## 2021-02-23 NOTE — ED NOTES
Pt amb to br w/o difficulty   Recorded Pressure: LV, HR=52, Condition=Condition 1 (Left Ventricle) LV 90/0/5

## 2021-03-15 DIAGNOSIS — Z23 NEED FOR VACCINATION: ICD-10-CM

## 2022-10-15 NOTE — PROGRESS NOTES
Report received, pt care assumed, tele box on. Pt assessment complete. Pt aaox4, no signs of distress noted at this time. POC discussed with pt and verbalizes no questions. Pt denies pain and denies any additional needs at this time. Bed in lowest position and locked. Pt educated on fall risk and verbalized understanding. Call light and personal belongings within reach. Will continue to monitor.      movement

## 2023-12-24 ENCOUNTER — HOSPITAL ENCOUNTER (EMERGENCY)
Facility: MEDICAL CENTER | Age: 58
End: 2023-12-24
Attending: EMERGENCY MEDICINE
Payer: MEDICAID

## 2023-12-24 VITALS
OXYGEN SATURATION: 96 % | DIASTOLIC BLOOD PRESSURE: 89 MMHG | HEART RATE: 87 BPM | TEMPERATURE: 97 F | SYSTOLIC BLOOD PRESSURE: 148 MMHG | HEIGHT: 72 IN | WEIGHT: 186.07 LBS | RESPIRATION RATE: 15 BRPM | BODY MASS INDEX: 25.2 KG/M2

## 2023-12-24 DIAGNOSIS — K08.89 PAIN, DENTAL: ICD-10-CM

## 2023-12-24 PROCEDURE — A9270 NON-COVERED ITEM OR SERVICE: HCPCS | Mod: UD | Performed by: EMERGENCY MEDICINE

## 2023-12-24 PROCEDURE — 99283 EMERGENCY DEPT VISIT LOW MDM: CPT

## 2023-12-24 PROCEDURE — 700111 HCHG RX REV CODE 636 W/ 250 OVERRIDE (IP): Mod: JZ,UD | Performed by: EMERGENCY MEDICINE

## 2023-12-24 PROCEDURE — 700102 HCHG RX REV CODE 250 W/ 637 OVERRIDE(OP): Mod: UD | Performed by: EMERGENCY MEDICINE

## 2023-12-24 PROCEDURE — 96372 THER/PROPH/DIAG INJ SC/IM: CPT

## 2023-12-24 RX ORDER — OXYCODONE HYDROCHLORIDE AND ACETAMINOPHEN 5; 325 MG/1; MG/1
1 TABLET ORAL EVERY 4 HOURS PRN
Qty: 15 TABLET | Refills: 0 | Status: SHIPPED | OUTPATIENT
Start: 2023-12-24 | End: 2023-12-27

## 2023-12-24 RX ORDER — AMOXICILLIN 500 MG/1
500 CAPSULE ORAL 3 TIMES DAILY
Qty: 21 CAPSULE | Refills: 0 | Status: ACTIVE | OUTPATIENT
Start: 2023-12-24 | End: 2023-12-31

## 2023-12-24 RX ORDER — CEFTRIAXONE 1 G/1
1000 INJECTION, POWDER, FOR SOLUTION INTRAMUSCULAR; INTRAVENOUS ONCE
Status: COMPLETED | OUTPATIENT
Start: 2023-12-24 | End: 2023-12-24

## 2023-12-24 RX ORDER — OXYCODONE HYDROCHLORIDE AND ACETAMINOPHEN 5; 325 MG/1; MG/1
1 TABLET ORAL ONCE
Status: COMPLETED | OUTPATIENT
Start: 2023-12-24 | End: 2023-12-24

## 2023-12-24 RX ADMIN — OXYCODONE AND ACETAMINOPHEN 1 TABLET: 5; 325 TABLET ORAL at 15:34

## 2023-12-24 RX ADMIN — CEFTRIAXONE SODIUM 1000 MG: 1 INJECTION, POWDER, FOR SOLUTION INTRAMUSCULAR; INTRAVENOUS at 15:34

## 2023-12-24 RX ADMIN — IBUPROFEN 800 MG: 200 TABLET, FILM COATED ORAL at 15:34

## 2023-12-24 NOTE — ED PROVIDER NOTES
"ED Provider Note    CHIEF COMPLAINT  Chief Complaint   Patient presents with    Dental Pain     Pt reports ongoing x4 days causing the R side of his face to swell. Pt believes the pain is secondary to \"pulling his grill out\".        EXTERNAL RECORDS REVIEWED  PDMP no controlled substances    HPI/ROS  LIMITATION TO HISTORY   Select: : None  OUTSIDE HISTORIAN(S):  None    Ulises Matthews is a 58 y.o. male who presents stating that he has dental pain in the right upper maxillary area with facial swelling.  The problem is slowly increased over the last 4 days.  He denies any trouble swallowing or breathing.  He reports he has a dentist at the [facility] that he can follow-up with.    PAST MEDICAL HISTORY   has a past medical history of Psychiatric disorder and Suicidal behavior.    SURGICAL HISTORY   has a past surgical history that includes other abdominal surgery and inguinal hernia repair robotic (Left, 7/13/2017).    FAMILY HISTORY  History reviewed. No pertinent family history.    SOCIAL HISTORY  Social History     Tobacco Use    Smoking status: Every Day     Current packs/day: 0.50     Average packs/day: 0.5 packs/day for 22.0 years (11.0 ttl pk-yrs)     Types: Cigarettes    Smokeless tobacco: Never   Substance and Sexual Activity    Alcohol use: Yes     Comment: \"Couple of beers\" - daily    Drug use: Yes     Types: Inhaled     Comment:  marijuana x3/wk.     Sexual activity: Not on file       CURRENT MEDICATIONS  Home Medications       Reviewed by Sera Betts R.N. (Registered Nurse) on 12/24/23 at 1254  Med List Status: Partial     Medication Last Dose Status   nitrofurantoin (MACROBID) 100 MG Cap  Active   oseltamivir (TAMIFLU) 75 MG Cap  Active                    ALLERGIES  No Known Allergies    PHYSICAL EXAM  VITAL SIGNS: BP (!) 148/89   Pulse 87   Temp 36.1 °C (97 °F) (Temporal)   Resp 15   Ht 1.829 m (6')   Wt 84.4 kg (186 lb 1.1 oz)   SpO2 96%   BMI 25.24 kg/m²      Mouth: The " patient has soft tissue swelling of the right upper lip with multiple dental caries.  There is no obvious abscess that I can drain.      COURSE & MEDICAL DECISION MAKING    ED Observation Status? No; Patient does not meet criteria for ED Observation.     INITIAL ASSESSMENT, COURSE AND PLAN  Care Narrative: The patient presents with dental infection of the right upper maxillary area.  Here he was given Rocephin 1 g IM as well as Percocet 5 mg p.o. with ibuprofen 800 mg p.o.  He is given a prescription for amoxicillin and Percocet and he will follow-up at the [facility] dentist.  He will return for worsening symptoms.        ADDITIONAL PROBLEM LIST  History of depression  DISPOSITION AND DISCUSSIONS  I have discussed management of the patient with the following physicians and YOEL's: None    Discussion of management with other [facility] or appropriate source(s): None     Escalation of care considered, and ultimately not performed:blood analysis and diagnostic imaging    Barriers to care at this time, including but not limited to:  None .     Decision tools and prescription drugs considered including, but not limited to:  Patient is prescribed outpatient antibiotics amoxicillin for dental caries .    I reviewed prescription monitoring program for patient's narcotic use before prescribing a scheduled drug.The patient will not drink alcohol nor drive with prescribed medications. The patient will return for new or worsening symptoms and is stable at the time of discharge.    The patient is referred to a primary physician for blood pressure management, diabetic screening, and for all other preventative health concerns.    In prescribing controlled substances to this patient, I certify that I have obtained and reviewed the medical history of Ulises Matthews. I have also made a good jamel effort to obtain applicable records from other providers who have treated the patient and records did not demonstrate any  increased risk of substance abuse that would prevent me from prescribing controlled substances.     I have conducted a physical exam and documented it. I have reviewed Mr. Matthews’s prescription history as maintained by the [place] Prescription Monitoring Program.     I have assessed the patient’s risk for abuse, dependency, and addiction using the validated Opioid Risk Tool available at https://www.mdcalc.com/hzrdtv-wsfn-sxxl-ort-narcotic-abuse.     Given the above, I believe the benefits of controlled substance therapy outweigh the risks. The reasons for prescribing controlled substances include non-narcotic, oral analgesic alternatives have been inadequate for pain control. Accordingly, I have discussed the risk and benefits, treatment plan, and alternative therapies with the patient.       DISPOSITION:  Patient will be discharged home in stable condition.    FOLLOW UP:  [facility], Emergency Dept  [address] [id number] [phone]    If symptoms worsen    [address] [facility] [address]  [phone]    Go to your dentist there even if the symptoms improve      OUTPATIENT MEDICATIONS:  New Prescriptions    AMOXICILLIN (AMOXIL) 500 MG CAP    Take 1 Capsule by mouth 3 times a day for 7 days.    OXYCODONE-ACETAMINOPHEN (PERCOCET) 5-325 MG TAB    Take 1 Tablet by mouth every four hours as needed for Moderate Pain for up to 3 days.         FINAL DIAGNOSIS  1. Pain, dental           Electronically signed by: Sarbjit Ramírez M.D., 12/24/2023 3:07 PM

## 2023-12-24 NOTE — ED NOTES
Discharge instructions reviewed with patient and signed. Controlled substance form reviewed and signed. They were instructed on how to  prescriptions. They verbalized understanding of follow up instructions. All belongings with patient. They ambulate with a steady gait

## 2023-12-24 NOTE — ED TRIAGE NOTES
"Chief Complaint   Patient presents with    Dental Pain     Pt reports ongoing x4 days causing the R side of his face to swell. Pt believes the pain is secondary to \"pulling his grill out\".      BP (!) 131/91   Pulse 96   Temp 35.9 °C (96.7 °F) (Temporal)   Resp 16   Ht 1.829 m (6')   Wt 84.4 kg (186 lb 1.1 oz)   SpO2 97%   BMI 25.24 kg/m²     Pt ambulatory to triage for above. Pt speaking in full word sentences without difficulty.   "

## (undated) DEVICE — NEEDLE INSFL 120MM 14GA VRRS - (20/BX)

## (undated) DEVICE — KIT ANESTHESIA W/CIRCUIT & 3/LT BAG W/FILTER (20EA/CA)

## (undated) DEVICE — COVER TIP ENDOWRIST HOT SHEAR - (10EA/BX) DA VINCI

## (undated) DEVICE — FORCEP BIPOLAR FENESTRATED - DA VINCI 10X'S REUSABLE

## (undated) DEVICE — SODIUM CHL IRRIGATION 0.9% 1000ML (12EA/CA)

## (undated) DEVICE — NEEDLE DRIVER SUTURE CUT - DA VINCI 10X'S REUSABLE

## (undated) DEVICE — TUBE NG SALEM SUMP 16FR (50EA/CA)

## (undated) DEVICE — GLOVE BIOGEL SZ 7 SURGICAL PF LTX - (50PR/BX 4BX/CA)

## (undated) DEVICE — CANNULA SEAL 8.5-13MM (10/BX)

## (undated) DEVICE — DRAPE LARGE 3 QUARTER - (20/CA)

## (undated) DEVICE — SCISSOR MONOPLR CRV(HOT SHEAR - DA VINCI 10X'S REUSABLE

## (undated) DEVICE — GLOVE BIOGEL INDICATOR SZ 7SURGICAL PF LTX - (50/BX 4BX/CA)

## (undated) DEVICE — LEAD SET 6 DISP. EKG NIHON KOHDEN

## (undated) DEVICE — LACTATED RINGERS INJ 1000 ML - (14EA/CA 60CA/PF)

## (undated) DEVICE — SUTURE GENERAL

## (undated) DEVICE — ELECTRODE DUAL RETURN W/ CORD - (50/PK)

## (undated) DEVICE — NEEDLE DRIVER LARGE - DA VINCI 10X'S REUSABLE

## (undated) DEVICE — SUTURE 2-0 VICRYL PLUS SH - 27 INCH (36/BX)

## (undated) DEVICE — GLOVE BIOGEL SZ 8 SURGICAL PF LTX - (50PR/BX 4BX/CA)

## (undated) DEVICE — SUTURE2-0 20CM STRATAFIX SPIRAL PDS CT-1 (12EA/BX)

## (undated) DEVICE — KIT ROOM DECONTAMINATION

## (undated) DEVICE — ROBOTIC SURGERY SERVICES

## (undated) DEVICE — TUBE CONNECT SUCTION CLEAR 120 X 1/4" (50EA/CA)"

## (undated) DEVICE — GLOVE BIOGEL INDICATOR SZ 8.5 SURGICAL PF LTX - (50/BX 4BX/CA)

## (undated) DEVICE — SLEEVE, VASO, THIGH, MED

## (undated) DEVICE — PROTECTOR ULNA NERVE - (36PR/CA)

## (undated) DEVICE — OBTURATOR 8MM BLADELESS - (24EA/BX) DA VINCI

## (undated) DEVICE — MASK ANESTHESIA ADULT  - (100/CA)

## (undated) DEVICE — Device

## (undated) DEVICE — SENSOR SPO2 NEO LNCS ADHESIVE (20/BX) SEE USER NOTES

## (undated) DEVICE — GLOVE BIOGEL INDICATOR SZ 7.5 SURGICAL PF LTX - (50PR/BX 4BX/CA)

## (undated) DEVICE — SUTURE 4-0 VICRYL PLUS FS-2 - 27 INCH (36/BX)

## (undated) DEVICE — GOWN WARMING STANDARD FLEX - (30/CA)

## (undated) DEVICE — SET LEADWIRE 5 LEAD BEDSIDE DISPOSABLE ECG (1SET OF 5/EA)

## (undated) DEVICE — CHLORAPREP 26 ML APPLICATOR - ORANGE TINT(25/CA)

## (undated) DEVICE — TUBE E-T HI-LO CUFF 7.5MM (10EA/PK)

## (undated) DEVICE — STERI STRIP COMPOUND BENZOIN - TINCTURE 0.6ML WITH APPLICATOR (40EA/BX)

## (undated) DEVICE — TROCAR 12 X 150 KII FIOS Z THREAD (6EA/BX)

## (undated) DEVICE — SET EXTENSION WITH 2 PORTS (48EA/CA) ***PART #2C8610 IS A SUBSTITUTE*****

## (undated) DEVICE — SUCTION INSTRUMENT YANKAUER BULBOUS TIP W/O VENT (50EA/CA)

## (undated) DEVICE — TUBING CLEARLINK DUO-VENT - C-FLO (48EA/CA)

## (undated) DEVICE — GOWN SURGEONS X-LARGE - DISP. (30/CA)

## (undated) DEVICE — ELECTRODE 850 FOAM ADHESIVE - HYDROGEL RADIOTRNSPRNT (50/PK)

## (undated) DEVICE — GLOVE BIOGEL PI INDICATOR SZ 6.5 SURGICAL PF LF - (50/BX 4BX/CA)

## (undated) DEVICE — WATER IRRIG. STER. 1500 ML - (9/CA)

## (undated) DEVICE — GLOVE BIOGEL SZ 6.5 SURGICAL PF LTX (50PR/BX 4BX/CA)

## (undated) DEVICE — DRAPE 3 ARM DA VANCI SI - (5EA/CA)

## (undated) DEVICE — CANISTER SUCTION 3000ML MECHANICAL FILTER AUTO SHUTOFF MEDI-VAC NONSTERILE LF DISP  (40EA/CA)

## (undated) DEVICE — HEAD HOLDER JUNIOR/ADULT

## (undated) DEVICE — SUTURE 0 VICRYL PLUS UR-6 - 27 INCH (36/BX)